# Patient Record
Sex: MALE | Race: WHITE | Employment: STUDENT | URBAN - METROPOLITAN AREA
[De-identification: names, ages, dates, MRNs, and addresses within clinical notes are randomized per-mention and may not be internally consistent; named-entity substitution may affect disease eponyms.]

---

## 2017-02-08 ENCOUNTER — OFFICE VISIT (OUTPATIENT)
Dept: FAMILY MEDICINE | Facility: CLINIC | Age: 22
End: 2017-02-08

## 2017-02-08 VITALS
SYSTOLIC BLOOD PRESSURE: 140 MMHG | HEIGHT: 75 IN | BODY MASS INDEX: 21.01 KG/M2 | WEIGHT: 169 LBS | HEART RATE: 93 BPM | DIASTOLIC BLOOD PRESSURE: 73 MMHG

## 2017-02-08 DIAGNOSIS — J01.10 ACUTE NON-RECURRENT FRONTAL SINUSITIS: Primary | ICD-10-CM

## 2017-02-08 RX ORDER — AZITHROMYCIN 250 MG/1
TABLET, FILM COATED ORAL
Qty: 6 TABLET | Refills: 1 | Status: SHIPPED | OUTPATIENT
Start: 2017-02-08 | End: 2018-12-09

## 2017-02-08 NOTE — Clinical Note
Date:February 9, 2017      Patient was self referred, no letter generated. Do not send.        [facility] Health Information

## 2017-02-08 NOTE — PROGRESS NOTES
HPI:  Jackson R Wellenstein is a 21 year old male with nasal congestion, sinus pain, cough    PMH:  No past medical history on file.    Active problem list:  There is no problem list on file for this patient.      FH:  No family history on file.    SH:  Social History     Social History     Marital Status: Single     Spouse Name: N/A     Number of Children: N/A     Years of Education: N/A     Occupational History     Not on file.     Social History Main Topics     Smoking status: Never Smoker      Smokeless tobacco: Never Used     Alcohol Use: No     Drug Use: No     Sexual Activity:     Partners: Female     Birth Control/ Protection: Condom     Other Topics Concern     Not on file     Social History Narrative       MEDS:  See EMR, reviewed  ALL:  See EMR, reviewed    REVIEW OF SYSTEMS:  CONSTITUTIONAL:NEGATIVE for fever, chills, change in weight  INTEGUMENTARY/SKIN: NEGATIVE for worrisome rashes, moles or lesions  EYES: NEGATIVE for vision changes or irritation  ENT/MOUTH: NEGATIVE for ear, mouth and throat problems  RESP:NEGATIVE for significant cough or SOB  BREAST: NEGATIVE for masses, tenderness or discharge  CV: NEGATIVE for chest pain, palpitations or peripheral edema  GI: NEGATIVE for nausea, abdominal pain, heartburn, or change in bowel habits  :NEGATIVE for frequency, dysuria, or hematuria  :NEGATIVE for frequency, dysuria, or hematuria  NEURO: NEGATIVE for weakness, dizziness or paresthesias  ENDOCRINE: NEGATIVE for temperature intolerance, skin/hair changes  HEME/ALLERGY/IMMUNE: NEGATIVE for bleeding problems  PSYCHIATRIC: NEGATIVE for changes in mood or affect          SUBJECTIVE:  This 21-year-old male had onset about a week and half ago of sore throat.  The sore throat resolved but it spread now to a persistent cough, nasal stuffiness, sinus congestion and a sense of pain about his frontal sinuses.  He feels achy and uncomfortable.  He did not get a flu shot this year.        OBJECTIVE:   Temperature is 99.4.  His nasopharynx is stuffy and he has a recurrent productive cough.  He says he has been noting bloody discharge with blowing his nose.  He has discomfort over the frontal sinuses, but there is no nuchal rigidity.  His neck is supple.  His oropharynx is not reddened and not swollen.  There is no posterior or anterior cervical adenopathy that is tender.  His thyroid is nontender and nonenlarged.  Chest is clear to auscultation in all fields.  Peripheral pulses are strong and symmetrical.      ASSESSMENT:  Sinusitis and respiratory infection.      PLAN:  Zithromax 250 mg 2 p.o. day 1 and 1 p.o. day 2-5.  Afrin nasal spray x3 days.

## 2017-02-08 NOTE — Clinical Note
2/8/2017      RE: Jackson R Wellenstein  [address] [address]       HPI:  Jackson R Wellenstein is a 21 year old male with nasal congestion, sinus pain, cough    PMH:  No past medical history on file.    Active problem list:  There is no problem list on file for this patient.      FH:  No family history on file.    SH:  Social History     Social History     Marital Status: Single     Spouse Name: N/A     Number of Children: N/A     Years of Education: N/A     Occupational History     Not on file.     Social History Main Topics     Smoking status: Never Smoker      Smokeless tobacco: Never Used     Alcohol Use: No     Drug Use: No     Sexual Activity:     Partners: Female     Birth Control/ Protection: Condom     Other Topics Concern     Not on file     Social History Narrative       MEDS:  See EMR, reviewed  ALL:  See EMR, reviewed    REVIEW OF SYSTEMS:  CONSTITUTIONAL:NEGATIVE for fever, chills, change in weight  INTEGUMENTARY/SKIN: NEGATIVE for worrisome rashes, moles or lesions  EYES: NEGATIVE for vision changes or irritation  ENT/MOUTH: NEGATIVE for ear, mouth and throat problems  RESP:NEGATIVE for significant cough or SOB  BREAST: NEGATIVE for masses, tenderness or discharge  CV: NEGATIVE for chest pain, palpitations or peripheral edema  GI: NEGATIVE for nausea, abdominal pain, heartburn, or change in bowel habits  :NEGATIVE for frequency, dysuria, or hematuria  :NEGATIVE for frequency, dysuria, or hematuria  NEURO: NEGATIVE for weakness, dizziness or paresthesias  ENDOCRINE: NEGATIVE for temperature intolerance, skin/hair changes  HEME/ALLERGY/IMMUNE: NEGATIVE for bleeding problems  PSYCHIATRIC: NEGATIVE for changes in mood or affect          SUBJECTIVE:  This 21-year-old male had onset about a week and half ago of sore throat.  The sore throat resolved but it spread now to a persistent cough, nasal stuffiness, sinus congestion and a sense of pain about his frontal sinuses.  He feels achy  and uncomfortable.  He did not get a flu shot this year.        OBJECTIVE:  Temperature is 99.4.  His nasopharynx is stuffy and he has a recurrent productive cough.  He says he has been noting bloody discharge with blowing his nose.  He has discomfort over the frontal sinuses, but there is no nuchal rigidity.  His neck is supple.  His oropharynx is not reddened and not swollen.  There is no posterior or anterior cervical adenopathy that is tender.  His thyroid is nontender and nonenlarged.  Chest is clear to auscultation in all fields.  Peripheral pulses are strong and symmetrical.      ASSESSMENT:  Sinusitis and respiratory infection.      PLAN:  Zithromax 250 mg 2 p.o. day 1 and 1 p.o. day 2-5.  Afrin nasal spray x3 days.                           Ammon Buchanan MD

## 2017-06-06 ENCOUNTER — OFFICE VISIT (OUTPATIENT)
Dept: FAMILY MEDICINE | Facility: CLINIC | Age: 22
End: 2017-06-06

## 2017-06-06 VITALS
BODY MASS INDEX: 21.14 KG/M2 | WEIGHT: 170 LBS | HEART RATE: 48 BPM | DIASTOLIC BLOOD PRESSURE: 51 MMHG | SYSTOLIC BLOOD PRESSURE: 120 MMHG | HEIGHT: 75 IN

## 2017-06-06 DIAGNOSIS — S76.112A QUADRICEPS MUSCLE RUPTURE, LEFT, INITIAL ENCOUNTER: Primary | ICD-10-CM

## 2017-06-06 NOTE — MR AVS SNAPSHOT
"              After Visit Summary   6/6/2017    Jackson R Wellenstein    MRN: [id number]           Patient Information     Date Of Birth          1995        Visit Information        Provider Department      6/6/2017 11:15 AM Brittni Jha MD [facility]        Today's Diagnoses     Quadriceps muscle rupture, left, initial encounter    -  1       Follow-ups after your visit        Who to contact     Please call your clinic at [phone] to:    Ask questions about your health    Make or cancel appointments    Discuss your medicines    Learn about your test results    Speak to your doctor   If you have compliments or concerns about an experience at your clinic, or if you wish to file a complaint, please contact [facility] Patient Relations at [phone] or email us at [email or web address]         Additional Information About Your Visit        MyChart Information     [employer] gives you secure access to your electronic health record. If you see a primary care provider, you can also send messages to your care team and make appointments. If you have questions, please call your primary care clinic.  If you do not have a primary care provider, please call [phone] and they will assist you.      [employer] is an electronic gateway that provides easy, online access to your medical records. With [employer], you can request a clinic appointment, read your test results, renew a prescription or communicate with your care team.     To access your existing account, please contact your [facility] or call [phone] for assistance.        Care EveryWhere ID     This is your Care EveryWhere ID. This could be used by other organizations to access your [place] medical records  [id number]        Your Vitals Were     Pulse Height BMI (Body Mass Index)             48 1.905 m (6' 3\") 21.25 kg/m2          Blood Pressure from " Last 3 Encounters:   06/06/17 120/51   02/08/17 140/73   05/24/16 120/71    Weight from Last 3 Encounters:   06/06/17 77.1 kg (170 lb)   02/08/17 76.7 kg (169 lb)   05/24/16 77.1 kg (170 lb) (72 %)*     * Growth percentiles are based on [facility] 2-20 Years data.               Primary Care Provider    None Specified       No primary provider on file.        Thank you!     Thank you for choosing [facility]  for your care. Our goal is always to provide you with excellent care. Hearing back from our patients is one way we can continue to improve our services. Please take a few minutes to complete the written survey that you may receive in the mail after your visit with us. Thank you!             Your Updated Medication List - Protect others around you: Learn how to safely use, store and throw away your medicines at www.disposemymeds.org.          This list is accurate as of: 6/6/17 11:59 PM.  Always use your most recent med list.                   Brand Name Dispense Instructions for use    azithromycin 250 MG tablet    ZITHROMAX Z-VERONICA    6 tablet    Two pill orally day one, one pill orally days 2-5

## 2017-06-06 NOTE — LETTER
"  6/6/2017      RE: Jackson R Wellenstein  [address] [address]       HPI      Cc: L quad strain / mass  - pulled both quads this semester  - runs Track and Field  - R one started in the fall  - L one start over spring break  - has kept running and racing  - has noted a bump in the L quad  - R quad is 100% better  - L quad is somewhat inflexible, about 90% strong as well  - has been working to rehab the quad, but feels he has stopped improving      Medical History     Past Surgical History:   Procedure Laterality Date     TOE SURGERY Right summer 2013    OCD removal       No past medical history on file.    No Known Allergies    Social History     Social History     Marital status: Single     Spouse name: N/A     Number of children: N/A     Years of education: N/A     Occupational History     Not on file.     Social History Main Topics     Smoking status: Never Smoker     Smokeless tobacco: Never Used     Alcohol use No     Drug use: No     Sexual activity: Not Currently     Partners: Female     Birth control/ protection: Condom     Other Topics Concern     Not on file     Social History Narrative       No family history on file.    Current Outpatient Prescriptions   Medication     azithromycin (ZITHROMAX Z-VERONICA) 250 MG tablet     No current facility-administered medications for this visit.            Objective Findings     Vitals:    06/06/17 1112   BP: 120/51   Pulse: (!) 48   Weight: 170 lb (77.1 kg)   Height: 6' 3\" (1.905 m)         Physical Exam:  Gen: A&O in NAD  Psych: normal affect    MSK: L LE - mild deformity of the proximal quadriceps noted with activation of the muscle group.  The area where this is noted is mildly TTP, no hardening is noted.  FROM of the hip and knee pain free.  4/5 strength with straight leg raise on the L, otherwise 5/5 throughout LE b/l.        Assessment / Plan     1) L Quad Rupture  - bedside US today with evidence for likely proximal partial L quadriceps rupture with " little calcification at present  - giving symptoms ongoing x2+ months and plateau'd strength in rehab, check MRI to better characterize the lesion  - check XR as well  - cont rehab and focus on eccentrics for quads  - fu in 1 week for re-eval, could consider PRP depending on imaging results    Varun Aguilar ATC, was present for the entire encounter    patient seen and case dw Dr. Yudelka Rahman D.O.  Sports Medicine Fellow      Attending Note:   I have personally examined this patient and have reviewed the clinical presentation and progress note with the fellow. I agree with the treatment plan as outlined. The plan was formulated with the fellow on the day of the patient's visit. I have reviewed all imaging with the fellow and agree with the findings in the documentation.     Brittni Jha MD, CAQ, CCD  [facility]  Sports Medicine and Bone Health    Brittni Jha MD

## 2017-06-06 NOTE — LETTER
Date:June 13, 2017      Patient was self referred, no letter generated. Do not send.        [facility] Health Information

## 2017-06-06 NOTE — PROGRESS NOTES
Attending Note:   I have personally examined this patient and have reviewed the clinical presentation and progress note with the fellow. I agree with the treatment plan as outlined. The plan was formulated with the fellow on the day of the patient's visit. I have reviewed all imaging with the fellow and agree with the findings in the documentation.     Brittni Jha MD, CAQ, CCD  [facility]  Sports Medicine and Bone Health

## 2017-06-06 NOTE — PROGRESS NOTES
"HPI      Cc: L quad strain / mass  - pulled both quads this semester  - runs Track and Field  - R one started in the fall  - L one start over spring break  - has kept running and racing  - has noted a bump in the L quad  - R quad is 100% better  - L quad is somewhat inflexible, about 90% strong as well  - has been working to rehab the quad, but feels he has stopped improving      Medical History     Past Surgical History:   Procedure Laterality Date     TOE SURGERY Right summer 2013    OCD removal       No past medical history on file.    No Known Allergies    Social History     Social History     Marital status: Single     Spouse name: N/A     Number of children: N/A     Years of education: N/A     Occupational History     Not on file.     Social History Main Topics     Smoking status: Never Smoker     Smokeless tobacco: Never Used     Alcohol use No     Drug use: No     Sexual activity: Not Currently     Partners: Female     Birth control/ protection: Condom     Other Topics Concern     Not on file     Social History Narrative       No family history on file.    Current Outpatient Prescriptions   Medication     azithromycin (ZITHROMAX Z-VERONICA) 250 MG tablet     No current facility-administered medications for this visit.            Objective Findings     Vitals:    06/06/17 1112   BP: 120/51   Pulse: (!) 48   Weight: 170 lb (77.1 kg)   Height: 6' 3\" (1.905 m)         Physical Exam:  Gen: A&O in NAD  Psych: normal affect    MSK: L LE - mild deformity of the proximal quadriceps noted with activation of the muscle group.  The area where this is noted is mildly TTP, no hardening is noted.  FROM of the hip and knee pain free.  4/5 strength with straight leg raise on the L, otherwise 5/5 throughout LE b/l.        Assessment / Plan     1) L Quad Rupture  - bedside US today with evidence for likely proximal partial L quadriceps rupture with little calcification at present  - giving symptoms ongoing x2+ months and plateau'd " strength in rehab, check MRI to better characterize the lesion  - check XR as well  - cont rehab and focus on eccentrics for quads  - fu in 1 week for re-eval, could consider PRP depending on imaging results    Varun Aguilar ATC, was present for the entire encounter    patient seen and case dw Dr. Yudelka Rahman D.O.  Sports Medicine Fellow

## 2017-06-15 ENCOUNTER — OFFICE VISIT (OUTPATIENT)
Dept: ORTHOPEDICS | Facility: CLINIC | Age: 22
End: 2017-06-15

## 2017-06-15 VITALS
DIASTOLIC BLOOD PRESSURE: 51 MMHG | HEIGHT: 75 IN | SYSTOLIC BLOOD PRESSURE: 120 MMHG | BODY MASS INDEX: 21.14 KG/M2 | WEIGHT: 170 LBS

## 2017-06-15 DIAGNOSIS — S76.912D: Primary | ICD-10-CM

## 2017-06-15 RX ORDER — HYDROCODONE BITARTRATE AND ACETAMINOPHEN 5; 325 MG/1; MG/1
1-2 TABLET ORAL EVERY 4 HOURS PRN
Qty: 20 TABLET | Refills: 0 | Status: SHIPPED | OUTPATIENT
Start: 2017-06-15 | End: 2018-12-09

## 2017-06-15 NOTE — LETTER
"  6/15/2017      RE: Jackson R Wellenstein  [address] [address]       Attending Note:   I have personally examined this patient and have reviewed the clinical presentation and progress note with the fellow. I agree with the treatment plan as outlined. The plan was formulated with the fellow on the day of the patient's visit. I have reviewed all imaging with the fellow and agree with the findings in the documentation. I have assisted with and supervised the entire blood draw, PRP preparation and injection via MSK US guided performed by Dr. Rahman.     Brittni Jha MD, CAQ, CCD  [facility]  Sports Medicine and Bone Health    HPI      cc: PRP injection  - Shreyas presents today for PRP injection of rectus femoris tear      Medical History     Past Surgical History:   Procedure Laterality Date     TOE SURGERY Right summer 2013    OCD removal       No past medical history on file.    No Known Allergies    Social History     Social History     Marital status: Single     Spouse name: N/A     Number of children: N/A     Years of education: N/A     Occupational History     Not on file.     Social History Main Topics     Smoking status: Never Smoker     Smokeless tobacco: Never Used     Alcohol use No     Drug use: No     Sexual activity: Not Currently     Partners: Female     Birth control/ protection: Condom     Other Topics Concern     Not on file     Social History Narrative       No family history on file.    Current Outpatient Prescriptions   Medication     HYDROcodone-acetaminophen (NORCO) 5-325 MG per tablet     azithromycin (ZITHROMAX Z-VERONICA) 250 MG tablet     No current facility-administered medications for this visit.            Objective Findings     Vitals:    06/15/17 1458   BP: 120/51   Weight: 170 lb (77.1 kg)   Height: 6' 3\" (1.905 m)         Physical Exam:  Gen: A&O in NAD  CV: regular rate, extremities well perfused x4  Pulm: regular rate, without respiratory " distress  Derm: no rashes or lesions  Psych: normal affect    MSK: L Thigh - notable defect / bulge in anterior, proximal left thigh    Procedure Note  Procedure/Location: L Rectus Femoris PRP injection with ultrasound guidance  Approach: Anterior   Prior to procedure, risks and benefits explained to patient and written consent obtained.  Injectate: 6.5mL PRP, 15mL Lidocaine 1% w/o epinephrine using a 22g needle  Prior to injection, skin site was sterilized.  Local anesthetic was injected and well tolerated.  Injection was then administered in sterile fashion without complication under ultrasound guidance.  Bandage was placed over site.  Post procedure instructions discussed with patient.  Images of the procedure stored on the ultrasound unit used to perform the procedure.        Assessment / Plan     1) L Rectus Femoris Tear  - USG PRP injection successfully performed as above  - discussed to lightly limit activity for next few days and no NSAIDs  - short script of vicodin given for pain control  - f/u PRN    patient seen and case dw Dr. Yudelka Rahman, D.O.  Sports Medicine Fellow      Oleg Rahman MD

## 2017-06-15 NOTE — LETTER
Date:June 20, 2017      Patient was self referred, no letter generated. Do not send.        [facility] Health Information

## 2017-06-15 NOTE — MR AVS SNAPSHOT
"              After Visit Summary   6/15/2017    Jackson R Wellenstein    MRN: [id number]           Patient Information     Date Of Birth          1995        Visit Information        Provider Department      6/15/2017 3:00 PM Oleg Rahman MD [facility]        Today's Diagnoses     Rupture of rectus femoris tendon, left, subsequent encounter    -  1       Follow-ups after your visit        Who to contact     Please call your clinic at [phone] to:    Ask questions about your health    Make or cancel appointments    Discuss your medicines    Learn about your test results    Speak to your doctor   If you have compliments or concerns about an experience at your clinic, or if you wish to file a complaint, please contact [facility] Patient Relations at [phone] or email us at [email or web address]         Additional Information About Your Visit        MyChart Information     [employer] gives you secure access to your electronic health record. If you see a primary care provider, you can also send messages to your care team and make appointments. If you have questions, please call your primary care clinic.  If you do not have a primary care provider, please call [phone] and they will assist you.      [employer] is an electronic gateway that provides easy, online access to your medical records. With [employer], you can request a clinic appointment, read your test results, renew a prescription or communicate with your care team.     To access your existing account, please contact your [facility] or call [phone] for assistance.        Care EveryWhere ID     This is your Care EveryWhere ID. This could be used by other organizations to access your [facility] medical records  [id number]        Your Vitals Were     Height BMI (Body Mass Index)                6' 3\" (1.905 m) 21.25 kg/m2           Blood Pressure from Last 3 " Encounters:   06/15/17 120/51   06/06/17 120/51   02/08/17 140/73    Weight from Last 3 Encounters:   06/15/17 170 lb (77.1 kg)   06/06/17 170 lb (77.1 kg)   02/08/17 169 lb (76.7 kg)              Today, you had the following     No orders found for display         Today's Medication Changes          These changes are accurate as of: 6/15/17 11:59 PM.  If you have any questions, ask your nurse or doctor.               Start taking these medicines.        Dose/Directions    HYDROcodone-acetaminophen 5-325 MG per tablet   Commonly known as:  NORCO   Used for:  Rupture of rectus femoris tendon, left, subsequent encounter   Started by:  Oleg Rahman MD        Dose:  1-2 tablet   Take 1-2 tablets by mouth every 4 hours as needed for moderate to severe pain   Quantity:  20 tablet   Refills:  0            Where to get your medicines      Some of these will need a paper prescription and others can be bought over the counter.  Ask your nurse if you have questions.     Bring a paper prescription for each of these medications     HYDROcodone-acetaminophen 5-325 MG per tablet                Primary Care Provider    None Specified       No primary provider on file.        Thank you!     Thank you for choosing [facility]  for your care. Our goal is always to provide you with excellent care. Hearing back from our patients is one way we can continue to improve our services. Please take a few minutes to complete the written survey that you may receive in the mail after your visit with us. Thank you!             Your Updated Medication List - Protect others around you: Learn how to safely use, store and throw away your medicines at www.disposemymeds.org.          This list is accurate as of: 6/15/17 11:59 PM.  Always use your most recent med list.                   Brand Name Dispense Instructions for use    azithromycin 250 MG tablet    ZITHROMAX Z-VERONICA    6 tablet    Two pill orally day one, one pill orally days  2-5       HYDROcodone-acetaminophen 5-325 MG per tablet    NORCO    20 tablet    Take 1-2 tablets by mouth every 4 hours as needed for moderate to severe pain

## 2017-06-16 NOTE — PROGRESS NOTES
"HPI      cc: PRP injection  - Shreyas presents today for PRP injection of rectus femoris tear      Medical History     Past Surgical History:   Procedure Laterality Date     TOE SURGERY Right summer 2013    OCD removal       No past medical history on file.    No Known Allergies    Social History     Social History     Marital status: Single     Spouse name: N/A     Number of children: N/A     Years of education: N/A     Occupational History     Not on file.     Social History Main Topics     Smoking status: Never Smoker     Smokeless tobacco: Never Used     Alcohol use No     Drug use: No     Sexual activity: Not Currently     Partners: Female     Birth control/ protection: Condom     Other Topics Concern     Not on file     Social History Narrative       No family history on file.    Current Outpatient Prescriptions   Medication     HYDROcodone-acetaminophen (NORCO) 5-325 MG per tablet     azithromycin (ZITHROMAX Z-VERONICA) 250 MG tablet     No current facility-administered medications for this visit.            Objective Findings     Vitals:    06/15/17 1458   BP: 120/51   Weight: 170 lb (77.1 kg)   Height: 6' 3\" (1.905 m)         Physical Exam:  Gen: A&O in NAD  CV: regular rate, extremities well perfused x4  Pulm: regular rate, without respiratory distress  Derm: no rashes or lesions  Psych: normal affect    MSK: L Thigh - notable defect / bulge in anterior, proximal left thigh    Procedure Note  Procedure/Location: L Rectus Femoris PRP injection with ultrasound guidance  Approach: Anterior   Prior to procedure, risks and benefits explained to patient and written consent obtained.  Injectate: 6.5mL PRP, 15mL Lidocaine 1% w/o epinephrine using a 22g needle  Prior to injection, skin site was sterilized.  Local anesthetic was injected and well tolerated.  Injection was then administered in sterile fashion without complication under ultrasound guidance.  Bandage was placed over site.  Post procedure instructions " discussed with patient.  Images of the procedure stored on the ultrasound unit used to perform the procedure.        Assessment / Plan     1) L Rectus Femoris Tear  - USG PRP injection successfully performed as above  - discussed to lightly limit activity for next few days and no NSAIDs  - short script of vicodin given for pain control  - f/u PRN    patient seen and case dw Dr. Yudelka Rahman, STEPHEN.O.  Sports Medicine Fellow

## 2017-08-22 ENCOUNTER — OFFICE VISIT (OUTPATIENT)
Dept: FAMILY MEDICINE | Facility: CLINIC | Age: 22
End: 2017-08-22

## 2017-08-22 VITALS
SYSTOLIC BLOOD PRESSURE: 123 MMHG | HEIGHT: 75 IN | DIASTOLIC BLOOD PRESSURE: 76 MMHG | BODY MASS INDEX: 22.26 KG/M2 | HEART RATE: 59 BPM | WEIGHT: 179 LBS

## 2017-08-22 DIAGNOSIS — S76.112S QUADRICEPS MUSCLE RUPTURE, LEFT, SEQUELA: Primary | ICD-10-CM

## 2017-08-22 NOTE — MR AVS SNAPSHOT
"              After Visit Summary   8/22/2017    Jackson R Wellenstein    MRN: [id number]           Patient Information     Date Of Birth          1995        Visit Information        Provider Department      8/22/2017 3:15 PM Brittni Jha MD [facility]        Today's Diagnoses     Quadriceps muscle rupture, left, sequela    -  1       Follow-ups after your visit        Who to contact     Please call your clinic at [phone] to:    Ask questions about your health    Make or cancel appointments    Discuss your medicines    Learn about your test results    Speak to your doctor   If you have compliments or concerns about an experience at your clinic, or if you wish to file a complaint, please contact [facility] Patient Relations at [phone] or email us at [email or web address]         Additional Information About Your Visit        MyChart Information     [employer] gives you secure access to your electronic health record. If you see a primary care provider, you can also send messages to your care team and make appointments. If you have questions, please call your primary care clinic.  If you do not have a primary care provider, please call [phone] and they will assist you.      [employer] is an electronic gateway that provides easy, online access to your medical records. With [employer], you can request a clinic appointment, read your test results, renew a prescription or communicate with your care team.     To access your existing account, please contact your [facility] or call [phone] for assistance.        Care EveryWhere ID     This is your Care EveryWhere ID. This could be used by other organizations to access your [place] medical records  [id number]        Your Vitals Were     Pulse Height BMI (Body Mass Index)             59 6' 3\" (1.905 m) 22.37 kg/m2          Blood Pressure from Last 3 " Encounters:   08/22/17 123/76   06/15/17 120/51   06/06/17 120/51    Weight from Last 3 Encounters:   08/22/17 179 lb (81.2 kg)   06/15/17 170 lb (77.1 kg)   06/06/17 170 lb (77.1 kg)              Today, you had the following     No orders found for display       Primary Care Provider    None Specified       No primary provider on file.        Equal Access to Services     [facility] : Hadii neela Ram, walewda ashwini, qatyrellta kaalmada pita, ermelinda dunlapmirapattie de león . So [facility] [phone].    ATENCIÓN: Si kota joshi, tiene a barrientos disposición servicios gratuitos de asistencia lingüística. Llame al [phone].    We comply with applicable federal civil rights laws and [place] laws. We do not discriminate on the basis of race, color, national origin, age, disability sex, sexual orientation or gender identity.            Thank you!     Thank you for choosing [facility]  for your care. Our goal is always to provide you with excellent care. Hearing back from our patients is one way we can continue to improve our services. Please take a few minutes to complete the written survey that you may receive in the mail after your visit with us. Thank you!             Your Updated Medication List - Protect others around you: Learn how to safely use, store and throw away your medicines at www.disposemymeds.org.          This list is accurate as of: 8/22/17 11:59 PM.  Always use your most recent med list.                   Brand Name Dispense Instructions for use Diagnosis    azithromycin 250 MG tablet    ZITHROMAX Z-VERONICA    6 tablet    Two pill orally day one, one pill orally days 2-5    Acute non-recurrent frontal sinusitis       HYDROcodone-acetaminophen 5-325 MG per tablet    NORCO    20 tablet    Take 1-2 tablets by mouth every 4 hours as needed for moderate to severe pain    Rupture of rectus femoris tendon, left, subsequent encounter

## 2017-08-22 NOTE — LETTER
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  He is here today to follow up on his PRP to the left rectus femoris muscle done 06/2017.   The patient states that it took about 5 weeks to really notice a difference but seems to have really helped.  He is back to running fully with no pain.  He has done his rehab.  His strength is good.  He feels that it is even on both sides.  He is not having any pain with lifting.  His inflexibility has improved as well.  He is very happy he did the PRP.  He has a mass that he notes which he had prior, although it does not seem to bother him any longer.      OBJECTIVE:  Left quad, he does have a mass from the tear of the rectus femoris that is in the mid proximal thigh anteriorly.  It is really nontender to palpation.  He has good strength, good flexibility.  We looked at it with the musculoskeletal ultrasound.  He has definitely shown some improvement in the defects that were previously seen on ultrasound.      ASSESSMENT:  Left rectus femoris tear, status post PRP doing well.      PLAN:  At this time Shreyas is very functional with no pain and we feel that he can be released to all activities as tolerated.  We are happy to see him back at any time if he is having difficulties or problems.        Bandar Avelar MD, Sports Medicine Fellow, and Valdo Serrano ATC, were present for the entire appointment.     Brittni Jha MD, CAQ, FACSM, CCD  [facility]  Sports Medicine and Bone Health  Team Physician;  Athletics      Brittni Jha MD

## 2017-08-22 NOTE — PROGRESS NOTES
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  He is here today to follow up on his PRP to the left rectus femoris muscle done 06/2017.   The patient states that it took about 5 weeks to really notice a difference but seems to have really helped.  He is back to running fully with no pain.  He has done his rehab.  His strength is good.  He feels that it is even on both sides.  He is not having any pain with lifting.  His inflexibility has improved as well.  He is very happy he did the PRP.  He has a mass that he notes which he had prior, although it does not seem to bother him any longer.      OBJECTIVE:  Left quad, he does have a mass from the tear of the rectus femoris that is in the mid proximal thigh anteriorly.  It is really nontender to palpation.  He has good strength, good flexibility.  We looked at it with the musculoskeletal ultrasound.  He has definitely shown some improvement in the defects that were previously seen on ultrasound.      ASSESSMENT:  Left rectus femoris tear, status post PRP doing well.      PLAN:  At this time Shreyas is very functional with no pain and we feel that he can be released to all activities as tolerated.  We are happy to see him back at any time if he is having difficulties or problems.        Bandar Avelar MD, Sports Medicine Fellow, and Valdo Serrano ATC, were present for the entire appointment.     Brittni Jha MD, CAQ, FACSM, CCD  [facility]  Sports Medicine and Bone Health  Team Physician;  Athletics

## 2017-08-22 NOTE — LETTER
Date:August 24, 2017      Patient was self referred, no letter generated. Do not send.        [facility] Health Information

## 2017-12-31 ENCOUNTER — HEALTH MAINTENANCE LETTER (OUTPATIENT)
Age: 22
End: 2017-12-31

## 2018-01-26 DIAGNOSIS — M79.671 RIGHT FOOT PAIN: Primary | ICD-10-CM

## 2018-01-27 ENCOUNTER — RADIANT APPOINTMENT (OUTPATIENT)
Dept: MRI IMAGING | Facility: CLINIC | Age: 23
End: 2018-01-27
Attending: FAMILY MEDICINE
Payer: COMMERCIAL

## 2018-01-27 DIAGNOSIS — M79.671 RIGHT FOOT PAIN: ICD-10-CM

## 2018-01-31 ENCOUNTER — OFFICE VISIT (OUTPATIENT)
Dept: ORTHOPEDICS | Facility: CLINIC | Age: 23
End: 2018-01-31
Payer: COMMERCIAL

## 2018-01-31 VITALS — SYSTOLIC BLOOD PRESSURE: 126 MMHG | HEART RATE: 51 BPM | DIASTOLIC BLOOD PRESSURE: 65 MMHG | RESPIRATION RATE: 16 BRPM

## 2018-01-31 DIAGNOSIS — M84.376A STRESS FRACTURE OF METATARSAL BONE, UNSPECIFIED LATERALITY, INITIAL ENCOUNTER: Primary | ICD-10-CM

## 2018-01-31 NOTE — LETTER
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 He was seen prior by this writer at a track event last Fri. He had moderate foot pain he noted while pushing off for sprints. Otherwise has no foot pain.     Background:   Date of injury: NA   Duration of symptoms: 2 weeks  Mechanism of Injury: Insidious Onset; Sports Related Track  Aggravating factors: Running, TTP  Relieving Factors: rest, ice and splinting  Prior Evaluation: MRI: 1/27/18    PAST MEDICAL, SOCIAL, SURGICAL AND FAMILY HISTORY: He  has no past medical history of Arthritis; Bleeding disorder (H); Cancer (H); Chronic kidney disease; CVA (cerebral infarction); Degenerative joint disease; Diabetes (H); Heart disease; Hepatitis; Hypertension; Surgical complication; or Uncomplicated asthma.  He  has a past surgical history that includes Toe Surgery (Right, summer 2013).  His family history is not on file.  He reports that he has never smoked. He has never used smokeless tobacco. He reports that he does not drink alcohol or use illicit drugs.    ALLERGIES: He has No Known Allergies.    CURRENT MEDICATIONS: He has a current medication list which includes the following prescription(s): hydrocodone-acetaminophen and azithromycin.     REVIEW OF SYSTEMS: 3 point review of systems is negative except as noted above.     Exam:   /65  Pulse 51  Resp 16           CONSTITUTIONAL: healthy, alert and no distress  HEAD: Normocephalic. No masses, lesions, tenderness or abnormalities  SKIN: no suspicious lesions or rashes  GAIT: normal  NEUROLOGIC: Non-focal  PSYCHIATRIC: affect normal/bright and mentation appears normal.    MUSCULOSKELETAL: TTP along lateral aspect of first MT, approximately 1/3 from distal articulation. No pain with resisted great toe extension/flexion. No pain with foot dorsi/plantar  flexion. No pain with inversion/eversion. No laxity.        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 He clinically is functional and is able to go about his ADLs without difficulty, but has evidence on exam and MRI of BSI.     Discussed that he should remain in boot while doing ADLs to provide maximum rest. Also advised that he could continue to run this weekend with the knowledge he may injure the area further. This is unlikely but still possible and he reported he understood this risk. Given his level of function and pain he likely can compete, but we advised he limit the amount of training and impact activities this week.     Will continue to follow and ask he RTC next Mon or Tues to evaluate how this weekend's events went in [place].     Ken Avelar MD  Primary Care Sports Medicine Fellow  February 3, 2018      Attending Note:   I have personally examined this patient and have reviewed the clinical presentation and progress note with the fellow. I agree with the treatment plan as outlined. The plan was formulated with the fellow on the day of the patient's visit. I have reviewed all imaging with the fellow and agree with the findings in the documentation.     Brittni Jha MD, CAQ, CCD  [facility]  Sports Medicine and Bone Health    Brittni Jha MD

## 2018-01-31 NOTE — LETTER
Date:February 9, 2018      Patient was self referred, no letter generated. Do not send.        [facility] Health Information

## 2018-01-31 NOTE — MR AVS SNAPSHOT
After Visit Summary   1/31/2018    Jackson R Wellenstein    MRN: [id number]           Patient Information     Date Of Birth          1995        Visit Information        Provider Department      1/31/2018 3:00 PM Brittni Jha MD [facility] Sports Medicine        Today's Diagnoses     Stress fracture of metatarsal bone, unspecified laterality, initial encounter    -  1       Follow-ups after your visit        Who to contact     Please call your clinic at [phone] to:    Ask questions about your health    Make or cancel appointments    Discuss your medicines    Learn about your test results    Speak to your doctor            Additional Information About Your Visit        MyChart Information     [employer] gives you secure access to your electronic health record. If you see a primary care provider, you can also send messages to your care team and make appointments. If you have questions, please call your primary care clinic.  If you do not have a primary care provider, please call [phone] and they will assist you.      [employer] is an electronic gateway that provides easy, online access to your medical records. With [employer], you can request a clinic appointment, read your test results, renew a prescription or communicate with your care team.     To access your existing account, please contact your [facility] or call [phone] for assistance.        Care EveryWhere ID     This is your Care EveryWhere ID. This could be used by other organizations to access your [place] medical records  [id number]        Your Vitals Were     Pulse Respirations                51 16           Blood Pressure from Last 3 Encounters:   02/06/18 142/74   01/31/18 126/65   08/22/17 123/76    Weight from Last 3 Encounters:   02/06/18 179 lb (81.2 kg)   08/22/17 179 lb (81.2 kg)   06/15/17 170 lb (77.1 kg)              Today, you had the following     No orders found for  display       Primary Care Provider    None Specified       No primary provider on file.        Equal Access to Services     BELEN INMAN : Hadii aad ku hadnuryslaury Ram, walewcarmita maradiaga, eliceoketan jeffersonbrittanyermelinda willismirapattie hammer. So [facility] [phone].    ATENCIÓN: Si habla español, tiene a barrientos disposición servicios gratuitos de asistencia lingüística. Llame al [phone].    We comply with applicable federal civil rights laws and [place] laws. We do not discriminate on the basis of race, color, national origin, age, disability, sex, sexual orientation, or gender identity.            Thank you!     Thank you for choosing [facility]  for your care. Our goal is always to provide you with excellent care. Hearing back from our patients is one way we can continue to improve our services. Please take a few minutes to complete the written survey that you may receive in the mail after your visit with us. Thank you!             Your Updated Medication List - Protect others around you: Learn how to safely use, store and throw away your medicines at www.disposemymeds.org.          This list is accurate as of 1/31/18 11:59 PM.  Always use your most recent med list.                   Brand Name Dispense Instructions for use Diagnosis    azithromycin 250 MG tablet    ZITHROMAX Z-VERONICA    6 tablet    Two pill orally day one, one pill orally days 2-5    Acute non-recurrent frontal sinusitis       HYDROcodone-acetaminophen 5-325 MG per tablet    NORCO    20 tablet    Take 1-2 tablets by mouth every 4 hours as needed for moderate to severe pain    Rupture of rectus femoris tendon, left, subsequent encounter

## 2018-01-31 NOTE — PROGRESS NOTES
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 He was seen prior by this writer at a track event last Fri. He had moderate foot pain he noted while pushing off for sprints. Otherwise has no foot pain.     Background:   Date of injury: NA   Duration of symptoms: 2 weeks  Mechanism of Injury: Insidious Onset; Sports Related Track  Aggravating factors: Running, TTP  Relieving Factors: rest, ice and splinting  Prior Evaluation: MRI: 1/27/18    PAST MEDICAL, SOCIAL, SURGICAL AND FAMILY HISTORY: He  has no past medical history of Arthritis; Bleeding disorder (H); Cancer (H); Chronic kidney disease; CVA (cerebral infarction); Degenerative joint disease; Diabetes (H); Heart disease; Hepatitis; Hypertension; Surgical complication; or Uncomplicated asthma.  He  has a past surgical history that includes Toe Surgery (Right, summer 2013).  His family history is not on file.  He reports that he has never smoked. He has never used smokeless tobacco. He reports that he does not drink alcohol or use illicit drugs.    ALLERGIES: He has No Known Allergies.    CURRENT MEDICATIONS: He has a current medication list which includes the following prescription(s): hydrocodone-acetaminophen and azithromycin.     REVIEW OF SYSTEMS: 3 point review of systems is negative except as noted above.     Exam:   /65  Pulse 51  Resp 16           CONSTITUTIONAL: healthy, alert and no distress  HEAD: Normocephalic. No masses, lesions, tenderness or abnormalities  SKIN: no suspicious lesions or rashes  GAIT: normal  NEUROLOGIC: Non-focal  PSYCHIATRIC: affect normal/bright and mentation appears normal.    MUSCULOSKELETAL: TTP along lateral aspect of first MT, approximately 1/3 from distal articulation. No pain with resisted great toe extension/flexion. No pain with foot dorsi/plantar flexion. No pain with inversion/eversion. No laxity.        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 He clinically is functional and is able to go about his ADLs without difficulty, but has evidence on exam and MRI of BSI.     Discussed that he should remain in boot while doing ADLs to provide maximum rest. Also advised that he could continue to run this weekend with the knowledge he may injure the area further. This is unlikely but still possible and he reported he understood this risk. Given his level of function and pain he likely can compete, but we advised he limit the amount of training and impact activities this week.     Will continue to follow and ask he RTC next Mon or Tues to evaluate how this weekend's events went in [place].     Ken Avelar MD  Primary Care Sports Medicine Fellow  February 3, 2018

## 2018-01-31 NOTE — PROGRESS NOTES
Attending Note:   I have personally examined this patient and have reviewed the clinical presentation and progress note with the fellow. I agree with the treatment plan as outlined. The plan was formulated with the fellow on the day of the patient's visit. I have reviewed all imaging with the fellow and agree with the findings in the documentation.     Brittni Jha MD, CAQ, CCD  [facility]  Sports Medicine and Bone Health

## 2018-02-06 ENCOUNTER — OFFICE VISIT (OUTPATIENT)
Dept: FAMILY MEDICINE | Facility: CLINIC | Age: 23
End: 2018-02-06
Payer: COMMERCIAL

## 2018-02-06 VITALS
WEIGHT: 179 LBS | HEIGHT: 75 IN | HEART RATE: 67 BPM | SYSTOLIC BLOOD PRESSURE: 142 MMHG | BODY MASS INDEX: 22.26 KG/M2 | DIASTOLIC BLOOD PRESSURE: 74 MMHG

## 2018-02-06 DIAGNOSIS — M79.671 RIGHT FOOT PAIN: Primary | ICD-10-CM

## 2018-02-06 DIAGNOSIS — M84.376D: ICD-10-CM

## 2018-02-06 NOTE — MR AVS SNAPSHOT
"              After Visit Summary   2/6/2018    Jackson R Wellenstein    MRN: [id number]           Patient Information     Date Of Birth          1995        Visit Information        Provider Department      2/6/2018 3:15 PM Brittni Jha MD [facility]        Today's Diagnoses     Right foot pain    -  1    Stress fracture of metatarsal with routine healing, subsequent encounter, unspecified laterality           Follow-ups after your visit        Who to contact     Please call your clinic at [phone] to:    Ask questions about your health    Make or cancel appointments    Discuss your medicines    Learn about your test results    Speak to your doctor            Additional Information About Your Visit        [employer] Information     [employer] gives you secure access to your electronic health record. If you see a primary care provider, you can also send messages to your care team and make appointments. If you have questions, please call your primary care clinic.  If you do not have a primary care provider, please call [phone] and they will assist you.      [employer] is an electronic gateway that provides easy, online access to your medical records. With [employer], you can request a clinic appointment, read your test results, renew a prescription or communicate with your care team.     To access your existing account, please contact your [facility] or call [phone] for assistance.        Care EveryWhere ID     This is your Care EveryWhere ID. This could be used by other organizations to access your [place] medical records  [id number]        Your Vitals Were     Pulse Height BMI (Body Mass Index)             67 6' 3\" (1.905 m) 22.37 kg/m2          Blood Pressure from Last 3 Encounters:   02/06/18 142/74   01/31/18 126/65   08/22/17 123/76    Weight from Last 3 Encounters:   02/06/18 179 lb (81.2 kg)   08/22/17 179 lb (81.2 kg)   06/15/17 " 170 lb (77.1 kg)               Primary Care Provider    None Specified       No primary provider on file.        Equal Access to Services     MIGUEL [facility], [facility] CampusSTANLEY : Hadii aad ku hadnuryslaury Yo, tresada rickielisbethha, kristian serrano jujumecca, ermelinda deein hayaapauline matuterosa m heinpattie hammer. So [facility] [phone].    ATENCIÓN: Si habla español, tiene a barrientos disposición servicios gratuitos de asistencia lingüística. Llame al [phone].    We comply with applicable federal civil rights laws and [place] laws. We do not discriminate on the basis of race, color, national origin, age, disability, sex, sexual orientation, or gender identity.            Thank you!     Thank you for choosing [facility]  for your care. Our goal is always to provide you with excellent care. Hearing back from our patients is one way we can continue to improve our services. Please take a few minutes to complete the written survey that you may receive in the mail after your visit with us. Thank you!             Your Updated Medication List - Protect others around you: Learn how to safely use, store and throw away your medicines at www.disposemymeds.org.          This list is accurate as of 2/6/18 11:59 PM.  Always use your most recent med list.                   Brand Name Dispense Instructions for use Diagnosis    azithromycin 250 MG tablet    ZITHROMAX Z-VERONICA    6 tablet    Two pill orally day one, one pill orally days 2-5    Acute non-recurrent frontal sinusitis       HYDROcodone-acetaminophen 5-325 MG per tablet    NORCO    20 tablet    Take 1-2 tablets by mouth every 4 hours as needed for moderate to severe pain    Rupture of rectus femoris tendon, left, subsequent encounter

## 2018-02-06 NOTE — LETTER
Date:February 9, 2018      Patient was self referred, no letter generated. Do not send.        [facility] Health Information

## 2018-02-06 NOTE — LETTER
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  He has a first metatarsal bone stress injury.  He was able to be in the boot and then compete this weekend and did fairly well.  His pain is definitely overall better.  He has not gotten the labs or the DEXA as of yet.  He is doing well otherwise.  He has been wearing the boot.  He was not limping at all out of the boot.  Racing did not set him back in any way.  He had some mild pain in his racing cleats.      OBJECTIVE:  Pleasant, in no apparent distress.  His right foot reveals some minor tenderness to palpation over the mid shaft of the first metatarsal.  There is no swelling.      ASSESSMENT:  Right foot first metatarsal bone stress injury.      PLAN:  At this time, he will run and do some training this week on Thursday.  He will run in a race on Saturday.  He will then try to run 2 times the following week, Tuesday and Friday.  He will remain in the boot.  If that all goes well, he will then run one time the following week and leave on [place], where he will compete on Friday.  If he is having increasing soreness or difficulties, then he will back off and do all cross-training with the very most minimal amount of running needed to be prepared for racing.  He will try to get the labs this week and the DEXA as well.  We will get updates from his ATC, Valdo Serrano, after this weekend.        Valdo Serrano was present for the last third of the appointment.     Brittni Jha MD, CAQ, FACSM, CCD  [facility]  Sports Medicine and Bone Health  Team Physician;  Athletics      Brittni Jha MD

## 2018-02-06 NOTE — PROGRESS NOTES
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  He has a first metatarsal bone stress injury.  He was able to be in the boot and then compete this weekend and did fairly well.  His pain is definitely overall better.  He has not gotten the labs or the DEXA as of yet.  He is doing well otherwise.  He has been wearing the boot.  He was not limping at all out of the boot.  Racing did not set him back in any way.  He had some mild pain in his racing cleats.      OBJECTIVE:  Pleasant, in no apparent distress.  His right foot reveals some minor tenderness to palpation over the mid shaft of the first metatarsal.  There is no swelling.      ASSESSMENT:  Right foot first metatarsal bone stress injury.      PLAN:  At this time, he will run and do some training this week on Thursday.  He will run in a race on Saturday.  He will then try to run 2 times the following week, Tuesday and Friday.  He will remain in the boot.  If that all goes well, he will then run one time the following week and leave on Wednesday for [place], where he will compete on Friday.  If he is having increasing soreness or difficulties, then he will back off and do all cross-training with the very most minimal amount of running needed to be prepared for racing.  He will try to get the labs this week and the DEXA as well.  We will get updates from his ATC, Valdo Serrano, after this weekend.        Valdo Serrano was present for the last third of the appointment.     Brittni Jha MD, CAQ, FACSM, CCD  [facility]  Sports Medicine and Bone Health  Team Physician;  Athletics

## 2018-02-08 ENCOUNTER — RADIANT APPOINTMENT (OUTPATIENT)
Dept: BONE DENSITY | Facility: CLINIC | Age: 23
End: 2018-02-08
Attending: FAMILY MEDICINE
Payer: COMMERCIAL

## 2018-02-08 DIAGNOSIS — R93.7 ABNORMAL BONE DENSITY SCREENING: ICD-10-CM

## 2018-02-08 DIAGNOSIS — M84.376D: ICD-10-CM

## 2018-02-08 LAB — CALCIUM SERPL-MCNC: 9.1 MG/DL (ref 8.5–10.1)

## 2018-02-09 LAB — DEPRECATED CALCIDIOL+CALCIFEROL SERPL-MC: 56 UG/L (ref 20–75)

## 2018-03-20 ENCOUNTER — OFFICE VISIT (OUTPATIENT)
Dept: FAMILY MEDICINE | Facility: CLINIC | Age: 23
End: 2018-03-20
Payer: COMMERCIAL

## 2018-03-20 DIAGNOSIS — M79.671 RIGHT FOOT PAIN: Primary | ICD-10-CM

## 2018-03-20 NOTE — LETTER
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  He has done very well with this.  He has been entirely off the week of spring break.  He is not really having any significant difficulties or pain from it.  At [employer] 2 weeks ago, he was sore after he ran, but he ran well himself individually, but the relay team that he was a part of did not have a good race.  He has some crunching occasionally around his 1st-2nd toes.  He has had this a few times since the stress injury.      OBJECTIVE:  Pleasant, in no apparent distress.  Vital signs as listed.  His right foot, there is no swelling.  He is really nontender to palpation except for some minor tenderness along the 2nd distal metatarsal tibial side.  There is no palpable callus in this area.  He has good 1st MTP range of motion that is nontender here.  He is nontender over his 3rd metatarsal.      ASSESSMENT:     1.  Healing 3rd metatarsal stress reaction.   2.  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      PLAN:  At this time, we will obtain x-rays and make these weightbearing to further assess his MTP joint and to look for hallux valgus.  If his x-rays show evidence of a healing stress injury in the 3rd metatarsal, then we will stop there with imaging.  If not, he will have an MRI of his foot.  We would like to know at this point in the outdoor season how hard he can begin to train.  The current plan is that he would do Monday as a nonimpact day, and he would have another nonimpact day per week as well to hopefully help keep him from new injury in terms of bone stress injuries.  He is comfortable with this plan.  I will discuss his case with Valdo Serrano ATC for  [employer] track [employer] field.     Brittni Jha MD, CAQ, FACSM, CCD  [facility]  Sports  Medicine and Bone Health  Team Physician;  Athletics      Brittni Jha MD

## 2018-03-20 NOTE — PROGRESS NOTES
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  He has done very well with this.  He has been entirely off the week of spring break.  He is not really having any significant difficulties or pain from it.  At [employer] 2 weeks ago, he was sore after he ran, but he ran well himself individually, but the relay team that he was a part of did not have a good race.  He has some crunching occasionally around his 1st-2nd toes.  He has had this a few times since the stress injury.      OBJECTIVE:  Pleasant, in no apparent distress.  Vital signs as listed.  His right foot, there is no swelling.  He is really nontender to palpation except for some minor tenderness along the 2nd distal metatarsal tibial side.  There is no palpable callus in this area.  He has good 1st MTP range of motion that is nontender here.  He is nontender over his 3rd metatarsal.      ASSESSMENT:     1.  Healing 3rd metatarsal stress reaction.   2.  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      PLAN:  At this time, we will obtain x-rays and make these weightbearing to further assess his MTP joint and to look for hallux valgus.  If his x-rays show evidence of a healing stress injury in the 3rd metatarsal, then we will stop there with imaging.  If not, he will have an MRI of his foot.  We would like to know at this point in the outdoor season how hard he can begin to train.  The current plan is that he would do Monday as a nonimpact day, and he would have another nonimpact day per week as well to hopefully help keep him from new injury in terms of bone stress injuries.  He is comfortable with this plan.  I will discuss his case with Valdo Serrano ATC for  [employer] track [employer] field.     Brittni Jha MD, CAQ, FACSM, CCD  [facility]  Sports Medicine and Bone Health  Team Physician;  Athletics

## 2018-03-20 NOTE — LETTER
Date:March 23, 2018      Patient was self referred, no letter generated. Do not send.        [facility] Health Information

## 2018-03-20 NOTE — MR AVS SNAPSHOT
After Visit Summary   3/20/2018    Jackson R Wellenstein    MRN: [id number]           Patient Information     Date Of Birth          1995        Visit Information        Provider Department      3/20/2018 4:00 PM Brittni Jha MD [facility]        Today's Diagnoses     Right foot pain    -  1       Follow-ups after your visit        Who to contact     Please call your clinic at [phone] to:    Ask questions about your health    Make or cancel appointments    Discuss your medicines    Learn about your test results    Speak to your doctor            Additional Information About Your Visit        MyChart Information     [employer] gives you secure access to your electronic health record. If you see a primary care provider, you can also send messages to your care team and make appointments. If you have questions, please call your primary care clinic.  If you do not have a primary care provider, please call [phone] and they will assist you.      [employer] is an electronic gateway that provides easy, online access to your medical records. With [employer], you can request a clinic appointment, read your test results, renew a prescription or communicate with your care team.     To access your existing account, please contact your [facility] or call [phone] for assistance.        Care EveryWhere ID     This is your Care EveryWhere ID. This could be used by other organizations to access your [place] medical records  [id number]         Blood Pressure from Last 3 Encounters:   02/06/18 142/74   01/31/18 126/65   08/22/17 123/76    Weight from Last 3 Encounters:   02/06/18 179 lb (81.2 kg)   08/22/17 179 lb (81.2 kg)   06/15/17 170 lb (77.1 kg)               Primary Care Provider    None Specified       No primary provider on file.        Equal Access to Services     BELEN INMAN : lynn Staley,  kristian jeffersonaleddie barotto deein hayaan adeeg kharash la'aan ah. So [facility] [phone].    ATENCIÓN: Si kota joshi, tiene a barrientos disposición servicios gratuitos de asistencia lingüística. Martin al [phone].    We comply with applicable federal civil rights laws and [place] laws. We do not discriminate on the basis of race, color, national origin, age, disability, sex, sexual orientation, or gender identity.            Thank you!     Thank you for choosing [facility]  for your care. Our goal is always to provide you with excellent care. Hearing back from our patients is one way we can continue to improve our services. Please take a few minutes to complete the written survey that you may receive in the mail after your visit with us. Thank you!             Your Updated Medication List - Protect others around you: Learn how to safely use, store and throw away your medicines at www.disposemymeds.org.          This list is accurate as of 3/20/18 11:59 PM.  Always use your most recent med list.                   Brand Name Dispense Instructions for use Diagnosis    azithromycin 250 MG tablet    ZITHROMAX Z-VERONICA    6 tablet    Two pill orally day one, one pill orally days 2-5    Acute non-recurrent frontal sinusitis       HYDROcodone-acetaminophen 5-325 MG per tablet    NORCO    20 tablet    Take 1-2 tablets by mouth every 4 hours as needed for moderate to severe pain    Rupture of rectus femoris tendon, left, subsequent encounter

## 2018-03-21 ENCOUNTER — RADIANT APPOINTMENT (OUTPATIENT)
Dept: GENERAL RADIOLOGY | Facility: CLINIC | Age: 23
End: 2018-03-21
Attending: FAMILY MEDICINE
Payer: COMMERCIAL

## 2018-03-21 DIAGNOSIS — M79.671 RIGHT FOOT PAIN: ICD-10-CM

## 2018-09-17 ENCOUNTER — OFFICE VISIT (OUTPATIENT)
Dept: ORTHOPEDICS | Facility: CLINIC | Age: 23
End: 2018-09-17
Payer: COMMERCIAL

## 2018-09-17 DIAGNOSIS — M79.671 RIGHT FOOT PAIN: Primary | ICD-10-CM

## 2018-09-17 NOTE — MR AVS SNAPSHOT
After Visit Summary   9/17/2018    Jackson R Wellenstein    MRN: [id number]           Patient Information     Date Of Birth          1995        Visit Information        Provider Department      9/17/2018 5:15 PM Lambert Hunter MD [facility]        Today's Diagnoses     Right foot pain    -  1       Follow-ups after your visit        Your next 10 appointments already scheduled     Sep 20, 2018  2:30 PM CDT   MR FOOT RIGHT W/O CONTRAST with [id number]   [facility] [place] [facility] ([facility])    [address] [address] [phone]           Take your medicines as usual, unless your doctor tells you not to. Bring a list of your current medicines to your exam (including vitamins, minerals and over-the-counter drugs). Also bring the results of similar scans you may have had.  Please remove any body piercings and hair extensions before you arrive.  Follow your doctor s orders. If you do not, we may have to postpone your exam.  You may or may not receive IV contrast for this exam pending the discretion of the Radiologist.  You do not need to do anything special to prepare.  The MRI machine uses a strong magnet. Please wear clothes without metal (snaps, zippers). A sweatsuit works well, or we may give you a hospital gown.   **IMPORTANT** THE INSTRUCTIONS BELOW ARE ONLY FOR THOSE PATIENTS WHO HAVE BEEN PRESCRIBED SEDATION OR GENERAL ANESTHESIA DURING THEIR MRI PROCEDURE:  IF YOUR DOCTOR PRESCRIBED ORAL SEDATION (take medicine to help you relax during your exam):   You must get the medicine from your doctor (oral medication) before you arrive. Bring the medicine to the exam. Do not take it at home. You ll be told when to take it upon arriving for your exam.   Arrive one hour early. Bring someone who can take you home after the test. Your medicine will make you sleepy. After the exam, you may not drive, take a  bus or take a taxi by yourself.  IF YOUR DOCTOR PRESCRIBED IV SEDATION:   Arrive one hour early. Bring someone who can take you home after the test. Your medicine will make you sleepy. After the exam, you may not drive, take a bus or take a taxi by yourself.   No eating 6 hours before your exam. You may have clear liquids up until 4 hours before your exam. (Clear liquids include water, clear tea, black coffee and fruit juice without pulp.)  IF YOUR DOCTOR PRESCRIBED ANESTHESIA (be asleep for your exam):   Arrive 1 1/2 hours early. Bring someone who can take you home after the test. You may not drive, take a bus or take a taxi by yourself.   No eating 8 hours before your exam. You may have clear liquids up until 4 hours before your exam. (Clear liquids include water, clear tea, black coffee and fruit juice without pulp.)   You will spend four to five hours in the recovery room.  Please call the Imaging Department at your exam site with any questions.            Sep 20, 2018  3:15 PM CDT   XR FOOT RIGHT G/E 3 VIEWS with [id number]   [facility]    [address]  [facility] [address] [phone]           How do I prepare for my exam? (Food and drink instructions) No Food and Drink Restrictions.  How do I prepare for my exam? (Other instructions) You do not need to do anything special for this exam.  What should I wear: Wear comfortable clothes.  How long does the exam take: Most scans take less than 5 minutes.  What should I bring: Bring a list of your medicines, including vitamins, minerals and over-the-counter drugs. It is safest to leave personal items at home.  Do I need a :  No  is needed.  What do I need to tell my doctor: Tell your doctor if there s any chance you are pregnant.  What should I do after the exam: No restrictions, You may resume normal activities.  What is this test: An image of a specific body part shown in  shades of black and white.  Who should I call with questions: If you have any questions, please call the Imaging Department where you will have your exam. Directions, parking instructions, and other information is available on our website, [email or web address].              Who to contact     Please call your clinic at [phone] to:    Ask questions about your health    Make or cancel appointments    Discuss your medicines    Learn about your test results    Speak to your doctor            Additional Information About Your Visit        [employer] Information     [employer] gives you secure access to your electronic health record. If you see a primary care provider, you can also send messages to your care team and make appointments. If you have questions, please call your primary care clinic.  If you do not have a primary care provider, please call [phone] and they will assist you.      [employer] is an electronic gateway that provides easy, online access to your medical records. With [employer], you can request a clinic appointment, read your test results, renew a prescription or communicate with your care team.     To access your existing account, please contact your [facility] or call [phone] for assistance.        Care EveryWhere ID     This is your Care EveryWhere ID. This could be used by other organizations to access your [place] medical records  [id number]         Blood Pressure from Last 3 Encounters:   02/06/18 142/74   01/31/18 126/65   08/22/17 123/76    Weight from Last 3 Encounters:   02/06/18 81.2 kg (179 lb)   08/22/17 81.2 kg (179 lb)   06/15/17 77.1 kg (170 lb)              Today, you had the following     No orders found for display       Primary Care Provider    None Specified       No primary provider on file.        Equal Access to Services     BELEN INMAN : lynn Staley, ermelinda colon  jarrell de león ah. So [facility] [phone].    ATENCIÓN: Si kota joshi, tiene a barrientos disposición servicios gratuitos de asistencia lingüística. Martin al [phone].    We comply with applicable federal civil rights laws and [place] laws. We do not discriminate on the basis of race, color, national origin, age, disability, sex, sexual orientation, or gender identity.            Thank you!     Thank you for choosing [facility] A[facility]  for your care. Our goal is always to provide you with excellent care. Hearing back from our patients is one way we can continue to improve our services. Please take a few minutes to complete the written survey that you may receive in the mail after your visit with us. Thank you!             Your Updated Medication List - Protect others around you: Learn how to safely use, store and throw away your medicines at www.disposemymeds.org.          This list is accurate as of 9/17/18 11:59 PM.  Always use your most recent med list.                   Brand Name Dispense Instructions for use Diagnosis    azithromycin 250 MG tablet    ZITHROMAX Z-VERONICA    6 tablet    Two pill orally day one, one pill orally days 2-5    Acute non-recurrent frontal sinusitis       HYDROcodone-acetaminophen 5-325 MG per tablet    NORCO    20 tablet    Take 1-2 tablets by mouth every 4 hours as needed for moderate to severe pain    Rupture of rectus femoris tendon, left, subsequent encounter

## 2018-09-17 NOTE — LETTER
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  This was not visualized on the plain radiographs.  It was encouraged that he seek a period of rest.  Unfortunately he had the upcoming [employer] meets and around that time he was not able take time off he was unable to complete the spring outdoor season though his foot kind of hurt the whole time.  Ultimately he never really took any period of rest and was able to participate through it.  He is now getting ready to begin his next season he notes still some complaints about his foot where it feels tight tone though it is clearly not as painful as it was before.  He would like to get this worked up to help her make a decision about whether he is going to consider red shirt this year versus pursuing his final year.    Exam today shows normal orientation of his foot.  He can stand on his tiptoes without difficulty.  His ankles and foot are stable.  And no definite or focal tenderness along the first second third or fourth or fifth metatarsals.  Toe motion and foot motion seems normal.    Clinical assessment:  Right foot stress fracture from approximately 1 year ago.  Improvement of clinical symptoms of some persist.    Plan:  We will get new standing foot films at this time we will also get an MRI of his foot with a marker.  Once this is complete I will review with the .  He is allowed to participate in the interim.    Lambert Hunter MD

## 2018-09-17 NOTE — LETTER
Date:September 21, 2018      Patient was self referred, no letter generated. Do not send.        [facility] Information

## 2018-09-20 ENCOUNTER — RADIANT APPOINTMENT (OUTPATIENT)
Dept: MRI IMAGING | Facility: CLINIC | Age: 23
End: 2018-09-20
Attending: ORTHOPAEDIC SURGERY
Payer: COMMERCIAL

## 2018-09-20 ENCOUNTER — RADIANT APPOINTMENT (OUTPATIENT)
Dept: GENERAL RADIOLOGY | Facility: CLINIC | Age: 23
End: 2018-09-20
Attending: ORTHOPAEDIC SURGERY
Payer: COMMERCIAL

## 2018-09-20 DIAGNOSIS — M79.671 RIGHT FOOT PAIN: ICD-10-CM

## 2018-09-24 ENCOUNTER — OFFICE VISIT (OUTPATIENT)
Dept: ORTHOPEDICS | Facility: CLINIC | Age: 23
End: 2018-09-24
Payer: COMMERCIAL

## 2018-09-24 VITALS
WEIGHT: 178 LBS | SYSTOLIC BLOOD PRESSURE: 132 MMHG | HEART RATE: 93 BPM | DIASTOLIC BLOOD PRESSURE: 72 MMHG | HEIGHT: 75 IN | BODY MASS INDEX: 22.13 KG/M2

## 2018-09-24 DIAGNOSIS — M79.671 RIGHT FOOT PAIN: Primary | ICD-10-CM

## 2018-09-24 NOTE — LETTER
Date:September 28, 2018      Patient was self referred, no letter generated. Do not send.        [facility] Information

## 2018-09-24 NOTE — MR AVS SNAPSHOT
"              After Visit Summary   9/24/2018    Jackson R Wellenstein    MRN: [id number]           Patient Information     Date Of Birth          1995        Visit Information        Provider Department      9/24/2018 4:15 PM Lambert Hunter MD [facility]        Today's Diagnoses     Right foot pain    -  1       Follow-ups after your visit        Who to contact     Please call your clinic at [phone] to:    Ask questions about your health    Make or cancel appointments    Discuss your medicines    Learn about your test results    Speak to your doctor            Additional Information About Your Visit        [employer] Information     [employer] gives you secure access to your electronic health record. If you see a primary care provider, you can also send messages to your care team and make appointments. If you have questions, please call your primary care clinic.  If you do not have a primary care provider, please call [phone] and they will assist you.      [employer] is an electronic gateway that provides easy, online access to your medical records. With [employer], you can request a clinic appointment, read your test results, renew a prescription or communicate with your care team.     To access your existing account, please contact your [facility] or call [phone] for assistance.        Care EveryWhere ID     This is your Care EveryWhere ID. This could be used by other organizations to access your [place] medical records  [id number]        Your Vitals Were     Pulse Height BMI (Body Mass Index)             93 1.892 m (6' 2.5\") 22.55 kg/m2          Blood Pressure from Last 3 Encounters:   09/24/18 132/72   02/06/18 142/74   01/31/18 126/65    Weight from Last 3 Encounters:   09/24/18 80.7 kg (178 lb)   02/06/18 81.2 kg (179 lb)   08/22/17 81.2 kg (179 lb)              Today, you had the following     No orders found for display       " Primary Care Provider    None Specified       No primary provider on file.        Equal Access to Services     BELEN INMAN : Hadii aad ku hadnuryslaury Ram, lynn maradiaga, eliceoketan branchmaermelinda willismirapattie hammer. So [facility] [phone].    ATENCIÓN: Si habla español, tiene a barrientos disposición servicios gratuitos de asistencia lingüística. [facility] [phone].    We comply with applicable federal civil rights laws and [place] laws. We do not discriminate on the basis of race, color, national origin, age, disability, sex, sexual orientation, or gender identity.            Thank you!     Thank you for choosing [facility]  for your care. Our goal is always to provide you with excellent care. Hearing back from our patients is one way we can continue to improve our services. Please take a few minutes to complete the written survey that you may receive in the mail after your visit with us. Thank you!             Your Updated Medication List - Protect others around you: Learn how to safely use, store and throw away your medicines at www.disposemymeds.org.          This list is accurate as of 9/24/18 11:59 PM.  Always use your most recent med list.                   Brand Name Dispense Instructions for use Diagnosis    azithromycin 250 MG tablet    ZITHROMAX Z-VERONICA    6 tablet    Two pill orally day one, one pill orally days 2-5    Acute non-recurrent frontal sinusitis       HYDROcodone-acetaminophen 5-325 MG per tablet    NORCO    20 tablet    Take 1-2 tablets by mouth every 4 hours as needed for moderate to severe pain    Rupture of rectus femoris tendon, left, subsequent encounter

## 2018-09-24 NOTE — LETTER
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  At that visit we reviewed his history of foot pain as well as his stress fracture/stress response to his foot.  He was given this diagnosis last spring he fully admits that he never really participated in a series of rest.  And is noted continued pain throughout this course of the last number of months.  He also fully admits that it is better now than it was before any more notes a little bit more tightness.  He does feel like he is altered his running gait a little bit.    He is currently in the decision about whether to pursue a red shirt the season and continuous training or to continue to compete.  This decision has not been made yet we elected to proceed with new imaging of his foot to see what anatomic structures appeared to be like prior to him having to make this decision he returns to clinic to discuss the results today.    He is currently participating in all activities.  He states that his foot does not feel perfectly normal and describes mostly a tightness through his foot though it is not nearly the same amount of pain that he had before.    Exam today shows normal structure of his foot.  No abnormalities on visual inspection no definite areas of PET tenderness to palpation.  Full motion of his hindfoot, midfoot, toes.    Plain films show no fractures dislocations no widening of the Lisfranc joint.  Normal plain radiographs.    MRI: Was reviewed today which does show some mild focal intramedullary marrow edema in the first metatarsal shaft this is in the area of the pain marker in the area of the previously noted stress reaction there is considerably less edema.  And both my and the radiologist shows that this is improved from its prior MRI.    A small  area of edema on the fourth metatarsal is also noted consistent with contusion.    Clinical assessment: Presumed resolving stress response right foot    Plan: I long discussion today with Shreyas as well as the .  In general I am encouraged by the results of this MRI.  There is clearly less edema in his foot I think this is good considering he is really provided no opportunity for rest over the last 9 months or so.  He does admit that he is gone somewhat easy over the last 4-6 weeks but in general he is pushed through this.  It is my thought that there is less fluid and edema on the first metatarsal that was previous and I do not think he is at significant risk of catastrophic fracture.  I think some of the edema that was seen in the fourth metatarsal is that he somewhat subconsciously rolls onto the lateral aspect of his foot to protect that first metatarsal.    I did discuss with him that this MRI is just a snapshot in time and it does not necessarily provide us the direction that this is going so it is possible that we are seeing the blooming of a new stress fracture however that is not currently consistent with his clinical history or examination.    At this time I have cleared him to participate in all activities I think any opportunity to continue to build and cross training would be very reasonable.  And I have encouraged this.  He and the athletic trainers voiced understanding of this as well.  Previously his vitamin D and calcium levels were assessed.  These were found to be normal.  The it is my understanding that the team undergoes replacement for these minerals once it starts to become darker here so I do not think that we need to further supplement him at this time though it certainly would defer that decision to 1 of our primary care sports medicine physicians.    He can continue to follow with me through the training room as necessary.    Lambert Hunter MD

## 2018-09-27 NOTE — PROGRESS NOTES
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  At that visit we reviewed his history of foot pain as well as his stress fracture/stress response to his foot.  He was given this diagnosis last spring he fully admits that he never really participated in a series of rest.  And is noted continued pain throughout this course of the last number of months.  He also fully admits that it is better now than it was before any more notes a little bit more tightness.  He does feel like he is altered his running gait a little bit.    He is currently in the decision about whether to pursue a red shirt the season and continuous training or to continue to compete.  This decision has not been made yet we elected to proceed with new imaging of his foot to see what anatomic structures appeared to be like prior to him having to make this decision he returns to clinic to discuss the results today.    He is currently participating in all activities.  He states that his foot does not feel perfectly normal and describes mostly a tightness through his foot though it is not nearly the same amount of pain that he had before.    Exam today shows normal structure of his foot.  No abnormalities on visual inspection no definite areas of PET tenderness to palpation.  Full motion of his hindfoot, midfoot, toes.    Plain films show no fractures dislocations no widening of the Lisfranc joint.  Normal plain radiographs.    MRI: Was reviewed today which does show some mild focal intramedullary marrow edema in the first metatarsal shaft this is in the area of the pain marker in the area of the previously noted stress reaction there is considerably less edema.  And both my and the radiologist shows that this is improved from its prior MRI.    A small area of edema on the fourth metatarsal is also noted consistent with  contusion.    Clinical assessment: Presumed resolving stress response right foot    Plan: I long discussion today with Shreyas as well as the .  In general I am encouraged by the results of this MRI.  There is clearly less edema in his foot I think this is good considering he is really provided no opportunity for rest over the last 9 months or so.  He does admit that he is gone somewhat easy over the last 4-6 weeks but in general he is pushed through this.  It is my thought that there is less fluid and edema on the first metatarsal that was previous and I do not think he is at significant risk of catastrophic fracture.  I think some of the edema that was seen in the fourth metatarsal is that he somewhat subconsciously rolls onto the lateral aspect of his foot to protect that first metatarsal.    I did discuss with him that this MRI is just a snapshot in time and it does not necessarily provide us the direction that this is going so it is possible that we are seeing the blooming of a new stress fracture however that is not currently consistent with his clinical history or examination.    At this time I have cleared him to participate in all activities I think any opportunity to continue to build and cross training would be very reasonable.  And I have encouraged this.  He and the athletic trainers voiced understanding of this as well.  Previously his vitamin D and calcium levels were assessed.  These were found to be normal.  The it is my understanding that the team undergoes replacement for these minerals once it starts to become darker here so I do not think that we need to further supplement him at this time though it certainly would defer that decision to 1 of our primary care sports medicine physicians.    He can continue to follow with me through the training room as necessary.

## 2018-10-10 DIAGNOSIS — R53.83 FATIGUE: Primary | ICD-10-CM

## 2018-10-11 DIAGNOSIS — R53.83 FATIGUE: ICD-10-CM

## 2018-10-11 LAB
FERRITIN SERPL-MCNC: 46 NG/ML (ref 26–388)
HGB BLD-MCNC: 13.8 G/DL (ref 13.3–17.7)

## 2018-12-09 ENCOUNTER — NURSE TRIAGE (OUTPATIENT)
Dept: NURSING | Facility: CLINIC | Age: 23
End: 2018-12-09

## 2018-12-09 ENCOUNTER — HOSPITAL ENCOUNTER (EMERGENCY)
Facility: CLINIC | Age: 23
Discharge: HOME | End: 2018-12-09
Attending: EMERGENCY MEDICINE | Admitting: EMERGENCY MEDICINE
Payer: COMMERCIAL

## 2018-12-09 ENCOUNTER — HOSPITAL ENCOUNTER (EMERGENCY)
Facility: CLINIC | Age: 23
End: 2018-12-09

## 2018-12-09 VITALS
HEIGHT: 75 IN | TEMPERATURE: 99.4 F | WEIGHT: 181 LBS | SYSTOLIC BLOOD PRESSURE: 123 MMHG | HEART RATE: 95 BPM | RESPIRATION RATE: 14 BRPM | BODY MASS INDEX: 22.5 KG/M2 | OXYGEN SATURATION: 99 % | DIASTOLIC BLOOD PRESSURE: 57 MMHG

## 2018-12-09 DIAGNOSIS — R11.2 NAUSEA AND VOMITING, INTRACTABILITY OF VOMITING NOT SPECIFIED, UNSPECIFIED VOMITING TYPE: ICD-10-CM

## 2018-12-09 DIAGNOSIS — R10.13 ABDOMINAL PAIN, EPIGASTRIC: ICD-10-CM

## 2018-12-09 DIAGNOSIS — K52.9 GASTROENTERITIS: ICD-10-CM

## 2018-12-09 LAB
ALBUMIN SERPL-MCNC: 4.1 G/DL (ref 3.4–5)
ALP SERPL-CCNC: 73 U/L (ref 40–150)
ALT SERPL W P-5'-P-CCNC: 27 U/L (ref 0–70)
ANION GAP SERPL CALCULATED.3IONS-SCNC: 8 MMOL/L (ref 3–14)
AST SERPL W P-5'-P-CCNC: 15 U/L (ref 0–45)
BASOPHILS # BLD AUTO: 0 10E9/L (ref 0–0.2)
BASOPHILS NFR BLD AUTO: 0 %
BILIRUB DIRECT SERPL-MCNC: 0.1 MG/DL (ref 0–0.2)
BILIRUB SERPL-MCNC: 0.6 MG/DL (ref 0.2–1.3)
BUN SERPL-MCNC: 14 MG/DL (ref 7–30)
CALCIUM SERPL-MCNC: 8.7 MG/DL (ref 8.5–10.1)
CHLORIDE SERPL-SCNC: 104 MMOL/L (ref 94–109)
CO2 SERPL-SCNC: 27 MMOL/L (ref 20–32)
CREAT SERPL-MCNC: 0.96 MG/DL (ref 0.66–1.25)
DIFFERENTIAL METHOD BLD: ABNORMAL
EOSINOPHIL # BLD AUTO: 0 10E9/L (ref 0–0.7)
EOSINOPHIL NFR BLD AUTO: 0 %
ERYTHROCYTE [DISTWIDTH] IN BLOOD BY AUTOMATED COUNT: 13 % (ref 10–15)
FLUAV+FLUBV AG SPEC QL: NEGATIVE
FLUAV+FLUBV AG SPEC QL: NEGATIVE
GFR SERPL CREATININE-BSD FRML MDRD: >90 ML/MIN/1.7M2
GLUCOSE SERPL-MCNC: 114 MG/DL (ref 70–99)
HCT VFR BLD AUTO: 44.8 % (ref 40–53)
HGB BLD-MCNC: 14.8 G/DL (ref 13.3–17.7)
IMM GRANULOCYTES # BLD: 0 10E9/L (ref 0–0.4)
IMM GRANULOCYTES NFR BLD: 0.2 %
LYMPHOCYTES # BLD AUTO: 0.4 10E9/L (ref 0.8–5.3)
LYMPHOCYTES NFR BLD AUTO: 2.9 %
MCH RBC QN AUTO: 30.8 PG (ref 26.5–33)
MCHC RBC AUTO-ENTMCNC: 33 G/DL (ref 31.5–36.5)
MCV RBC AUTO: 93 FL (ref 78–100)
MONOCYTES # BLD AUTO: 0.5 10E9/L (ref 0–1.3)
MONOCYTES NFR BLD AUTO: 4.1 %
NEUTROPHILS # BLD AUTO: 11.5 10E9/L (ref 1.6–8.3)
NEUTROPHILS NFR BLD AUTO: 92.8 %
NRBC # BLD AUTO: 0 10*3/UL
NRBC BLD AUTO-RTO: 0 /100
PLATELET # BLD AUTO: 239 10E9/L (ref 150–450)
POTASSIUM SERPL-SCNC: 3.6 MMOL/L (ref 3.4–5.3)
PROT SERPL-MCNC: 7.2 G/DL (ref 6.8–8.8)
RBC # BLD AUTO: 4.8 10E12/L (ref 4.4–5.9)
SODIUM SERPL-SCNC: 139 MMOL/L (ref 133–144)
SPECIMEN SOURCE: NORMAL
WBC # BLD AUTO: 12.4 10E9/L (ref 4–11)

## 2018-12-09 PROCEDURE — 99284 EMERGENCY DEPT VISIT MOD MDM: CPT | Mod: Z6 | Performed by: EMERGENCY MEDICINE

## 2018-12-09 PROCEDURE — 80048 BASIC METABOLIC PNL TOTAL CA: CPT | Performed by: EMERGENCY MEDICINE

## 2018-12-09 PROCEDURE — 96361 HYDRATE IV INFUSION ADD-ON: CPT | Performed by: EMERGENCY MEDICINE

## 2018-12-09 PROCEDURE — 99285 EMERGENCY DEPT VISIT HI MDM: CPT | Mod: 25 | Performed by: EMERGENCY MEDICINE

## 2018-12-09 PROCEDURE — 25000128 H RX IP 250 OP 636: Performed by: EMERGENCY MEDICINE

## 2018-12-09 PROCEDURE — 80076 HEPATIC FUNCTION PANEL: CPT | Performed by: EMERGENCY MEDICINE

## 2018-12-09 PROCEDURE — 85025 COMPLETE CBC W/AUTO DIFF WBC: CPT | Performed by: EMERGENCY MEDICINE

## 2018-12-09 PROCEDURE — 96374 THER/PROPH/DIAG INJ IV PUSH: CPT | Performed by: EMERGENCY MEDICINE

## 2018-12-09 PROCEDURE — 25000132 ZZH RX MED GY IP 250 OP 250 PS 637: Performed by: EMERGENCY MEDICINE

## 2018-12-09 PROCEDURE — 96375 TX/PRO/DX INJ NEW DRUG ADDON: CPT | Performed by: EMERGENCY MEDICINE

## 2018-12-09 PROCEDURE — 87804 INFLUENZA ASSAY W/OPTIC: CPT | Mod: 91 | Performed by: EMERGENCY MEDICINE

## 2018-12-09 RX ORDER — ONDANSETRON 4 MG/1
4 TABLET, ORALLY DISINTEGRATING ORAL EVERY 8 HOURS PRN
Qty: 8 TABLET | Refills: 0 | Status: SHIPPED | OUTPATIENT
Start: 2018-12-09

## 2018-12-09 RX ORDER — ONDANSETRON 4 MG/1
4 TABLET, ORALLY DISINTEGRATING ORAL EVERY 6 HOURS PRN
Qty: 10 TABLET | Refills: 0 | Status: SHIPPED | OUTPATIENT
Start: 2018-12-09 | End: 2018-12-12

## 2018-12-09 RX ORDER — SODIUM CHLORIDE 9 MG/ML
INJECTION, SOLUTION INTRAVENOUS CONTINUOUS
Status: DISCONTINUED | OUTPATIENT
Start: 2018-12-09 | End: 2018-12-09 | Stop reason: HOSPADM

## 2018-12-09 RX ORDER — METOCLOPRAMIDE HYDROCHLORIDE 5 MG/ML
5 INJECTION INTRAMUSCULAR; INTRAVENOUS ONCE
Status: COMPLETED | OUTPATIENT
Start: 2018-12-09 | End: 2018-12-09

## 2018-12-09 RX ORDER — ONDANSETRON 2 MG/ML
4 INJECTION INTRAMUSCULAR; INTRAVENOUS EVERY 30 MIN PRN
Status: DISCONTINUED | OUTPATIENT
Start: 2018-12-09 | End: 2018-12-09 | Stop reason: HOSPADM

## 2018-12-09 RX ORDER — ACETAMINOPHEN 325 MG/1
975 TABLET ORAL ONCE
Status: COMPLETED | OUTPATIENT
Start: 2018-12-09 | End: 2018-12-09

## 2018-12-09 RX ADMIN — SODIUM CHLORIDE 1000 ML: 9 INJECTION, SOLUTION INTRAVENOUS at 16:10

## 2018-12-09 RX ADMIN — SODIUM CHLORIDE: 9 INJECTION, SOLUTION INTRAVENOUS at 19:16

## 2018-12-09 RX ADMIN — METOCLOPRAMIDE 5 MG: 5 INJECTION, SOLUTION INTRAMUSCULAR; INTRAVENOUS at 18:00

## 2018-12-09 RX ADMIN — SODIUM CHLORIDE 1000 ML: 9 INJECTION, SOLUTION INTRAVENOUS at 18:00

## 2018-12-09 RX ADMIN — ACETAMINOPHEN 975 MG: 325 TABLET, FILM COATED ORAL at 19:16

## 2018-12-09 RX ADMIN — ONDANSETRON HYDROCHLORIDE 4 MG: 2 INJECTION, SOLUTION INTRAMUSCULAR; INTRAVENOUS at 16:10

## 2018-12-09 ASSESSMENT — ENCOUNTER SYMPTOMS
SHORTNESS OF BREATH: 0
CONFUSION: 0
FEVER: 0
BACK PAIN: 1
VOMITING: 1
BLOOD IN STOOL: 0
NECK STIFFNESS: 0
DIARRHEA: 1
DIFFICULTY URINATING: 0
ABDOMINAL PAIN: 1
EYE REDNESS: 0
COLOR CHANGE: 0
HEADACHES: 0
ARTHRALGIAS: 0
NAUSEA: 1
ANAL BLEEDING: 0

## 2018-12-09 ASSESSMENT — MIFFLIN-ST. JEOR: SCORE: 1906.64

## 2018-12-09 NOTE — ED PROVIDER NOTES
History     Chief Complaint   Patient presents with     Nausea & Vomiting     HPI  Jackson R Wellenstein is a 23 year old male who presents for evaluation of nausea, vomiting, and diarrhea. Patient complains of nausea, multiple episodes of vomiting, and mild diarrhea that began this morning around 5:30 AM. He has associated lightheadedness, mild abdominal pain, and back spasms secondary to vomiting. He denies fevers, cough, or hematemesis. Patient reports that Friday night he and several other people ate salad and chicken. He took leftovers of both foods home which he ate a lot of yesterday. He found out today that several other people were also ill with similar symptoms. No hx of abdominal surgery.     I have reviewed the Medications, Allergies, Past Medical and Surgical History, and Social History in the [facility] system.  History reviewed. No pertinent past medical history.    Past Surgical History:   Procedure Laterality Date     TOE SURGERY Right summer 2013    OCD removal       No family history on file.    Social History     Tobacco Use     Smoking status: Never Smoker     Smokeless tobacco: Never Used   Substance Use Topics     Alcohol use: No       Current Facility-Administered Medications   Medication     ondansetron (ZOFRAN) injection 4 mg     sodium chloride 0.9% infusion     Current Outpatient Medications   Medication     ondansetron (ZOFRAN ODT) 4 MG ODT tab      No Known Allergies    Review of Systems   Constitutional: Negative for fever.   HENT: Negative for congestion.    Eyes: Negative for redness.   Respiratory: Negative for shortness of breath.    Cardiovascular: Negative for chest pain.   Gastrointestinal: Positive for abdominal pain, diarrhea, nausea and vomiting. Negative for anal bleeding and blood in stool.   Genitourinary: Negative for difficulty urinating.   Musculoskeletal: Positive for back pain. Negative for arthralgias and neck stiffness.   Skin: Negative for color change.   Neurological:  "Negative for headaches.   Psychiatric/Behavioral: Negative for confusion.   All other systems reviewed and are negative.      Physical Exam   BP: 121/53  Pulse: 95  Temp: 99.4  F (37.4  C)  Resp: 14  Height: 190.5 cm (6' 3\")  Weight: 82.1 kg (181 lb)  SpO2: 95 %      Physical Exam   Constitutional: He is oriented to person, place, and time. He appears ill.   HENT:   Head: Normocephalic and atraumatic.   Eyes: Conjunctivae and EOM are normal. Pupils are equal, round, and reactive to light.   Neck: Normal range of motion. Neck supple.   Cardiovascular: Normal rate and regular rhythm. Exam reveals no gallop and no friction rub.   No murmur heard.  Pulmonary/Chest: Effort normal and breath sounds normal. No respiratory distress.   Abdominal: Soft. He exhibits no distension. There is no hepatosplenomegaly. There is tenderness in the right upper quadrant, epigastric area and left upper quadrant. There is no rigidity and no guarding.       Musculoskeletal: He exhibits no edema or tenderness.   Neurological: He is alert and oriented to person, place, and time. No cranial nerve deficit.   Skin: Skin is warm.   Psychiatric: He has a normal mood and affect. His behavior is normal. Judgment and thought content normal.   Nursing note and vitals reviewed.      ED Course     ED Course as of Dec 09 1944   Sun Dec 09, 2018   1703 Protein Total: 7.2   1708 WBC: (!) 12.4     Procedures       3:59 PM  The patient was seen and examined by Dr. Cuello in Room 4.          Critical Care time:  none             Labs Ordered and Resulted from Time of ED Arrival Up to the Time of Departure from the ED   CBC WITH PLATELETS DIFFERENTIAL - Abnormal; Notable for the following components:       Result Value    WBC 12.4 (*)     Absolute Neutrophil 11.5 (*)     Absolute Lymphocytes 0.4 (*)     All other components within normal limits   BASIC METABOLIC PANEL - Abnormal; Notable for the following components:    Glucose 114 (*)     All other " components within normal limits   HEPATIC PANEL   INFLUENZA A/B ANTIGEN            Assessments & Plan (with Medical Decision Making)   The patient has gastroneuritis symptoms after exposure to food that made others ill.  He appears dehydrated and quite rundown.  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  He also has 2 at a [employer] practices.  He was given several liters normal saline and Zofran followed by a round of Reglan for the abdominal cramping.  On recheck of the abdomen at 7:30 PM he still has some tenderness across the upper abdomen but states that he has waves of cramping pain there.  This is likely related to gastroenteritis and less likely colitis as he has no constant pain or blood or mucus in the diarrhea.  He was signed out to Dr. Lemus awaiting resolution of symptoms with fluids and Zofran.  If he does not feel better or is having worsening pain in the abdomen he will be admitted the observation unit, with whom I spoke about his history as a contingency plan.    I have reviewed the nursing notes.    I have reviewed the findings, diagnosis, plan and need for follow up with the patient.       Review of your medicines      START taking      Dose / Directions   ondansetron 4 MG ODT tab  Commonly known as:  ZOFRAN ODT      Dose:  4 mg  Take 1 tablet (4 mg) by mouth every 6 hours as needed for nausea  Quantity:  10 tablet  Refills:  0           Where to get your medicines      Some of these will need a paper prescription and others can be bought over the counter. Ask your nurse if you have questions.    Bring a paper prescription for each of these medications    ondansetron 4 MG ODT tab         Final diagnoses:   Gastroenteritis   Kinga AGUILAR, am serving as a trained medical scribe to document services personally performed by Brannon Cuello MD, based on the provider's statements to me.   IBrannon MD, was physically present and have reviewed  and verified the accuracy of this note documented by Kinga Zapata.      12/9/2018   [facility], [place], EMERGENCY DEPARTMENT     Ravinder Cuello MD  12/09/18 1944

## 2018-12-09 NOTE — ED TRIAGE NOTES
Friday evening had supper including chicken and lettuce salad. Started throwing up this morning. Other who were there have the same symptoms.

## 2018-12-09 NOTE — ED AVS SNAPSHOT
[facility], [place], Emergency Department  [address] [address]  Phone:  [phone]                                    Jackson R Wellenstein   MRN: [id number]    Department:  [facility], Emergency Department   Date of Visit:  12/9/2018           After Visit Summary Signature Page    I have received my discharge instructions, and my questions have been answered. I have discussed any challenges I see with this plan with the nurse or doctor.    ..........................................................................................................................................  Patient/Patient Representative Signature      ..........................................................................................................................................  Patient Representative Print Name and Relationship to Patient    ..................................................               ................................................  Date                                   Time    ..........................................................................................................................................  Reviewed by Signature/Title    ...................................................              ..............................................  Date                                               Time          22EPIC Rev 08/18

## 2018-12-09 NOTE — LETTER
December 9, 2018      To Whom It May Concern:      Jackson R Wellenstein was seen in our Emergency Department today, 12/09/18.  I expect his condition to improve over the next day.  He may return to work/school 12/11/2018.    Sincerely,        Frederick Villafana RN

## 2018-12-09 NOTE — TELEPHONE ENCOUNTER
"    Reason for Disposition    [1] SEVERE vomiting (e.g., 6 or more times/day) AND [2] present > 8 hours    Additional Information    Negative: Shock suspected (e.g., cold/pale/clammy skin, too weak to stand, low BP, rapid pulse)    Negative: Difficult to awaken or acting confused  (e.g., disoriented, slurred speech)    Negative: Sounds like a life-threatening emergency to the triager    Negative: Vomiting occurs only while coughing    Negative: [1] Pregnant < 20 Weeks AND [2] nausea/vomiting began in early pregnancy (i.e., 4-8 weeks pregnant)    Negative: Chest pain    Negative: [1] SEVERE headache AND [2] similar to prior migraines    Negative: [1] Vomiting AND [2] contains red blood or black (\"coffee ground\") material  (Exception: few red streaks in vomit that only happened once)    Negative: Severe pain in one eye    Negative: Recent head injury (within last 3 days)    Negative: Recent abdominal injury (within last 3 days)    Negative: [1] Insulin-dependent diabetes (Type I) AND [2] glucose > 400 mg/dl (22 mmol/l)    Answer Assessment - Initial Assessment Questions  1. VOMITING SEVERITY: \"How many times have you vomited in the past 24 hours?\"      - MILD:  1 - 2 times/day     - MODERATE: 3 - 5 times/day, decreased oral intake without significant weight loss or symptoms of dehydration     - SEVERE: 6 or more times/day, vomits everything or nearly everything, with significant weight loss, symptoms of dehydration       Severe, states he has vomited 15 times, after the first 2-3 times it has just been bile  2. ONSET: \"When did the vomiting begin?\"       Early this morning  3. FLUIDS: \"What fluids or food have you vomited up today?\" \"Have you been able to keep any fluids down?\"      Taking sips  4. ABDOMINAL PAIN: \"Are your having any abdominal pain?\" If yes : \"How bad is it and what does it feel like?\" (e.g., crampy, dull, intermittent, constant)       Back spasms, hips, and ribs hurt  5. DIARRHEA: \"Is there any " "diarrhea?\" If so, ask: \"How many times today?\"       x1  6. CONTACTS: \"Is there anyone else in the family with the same symptoms?\"       States 25 people got sick from the [employer] (student [employer])  7. CAUSE: \"What do you think is causing your vomiting?\"      ?food borne illness  8. HYDRATION STATUS: \"Any signs of dehydration?\" (e.g., dry mouth [not only dry lips], too weak to stand) \"When did you last urinate?\"      Dry mouth, weak and dizzy when he is up, only has been up to the bathroom, last urinated 3 hours ago  9. OTHER SYMPTOMS: \"Do you have any other symptoms?\" (e.g., fever, headache, vertigo, vomiting blood or coffee grounds)      Feels hot and cold  10. PREGNANCY: \"Is there any chance you are pregnant?\" \"When was your last menstrual period?\"        no    Protocols used: VOMITING-ADULT-AH      "

## 2018-12-10 NOTE — DISCHARGE INSTRUCTIONS
It is very important that you follow-up with your primary care doctor in 2 days to make sure that you are getting better and to see if any other test of treatments will be needed.  Please avoid any activity that causes pain.  If fevers nausea vomiting worse symptoms, or if any other concerns develop, please return to the emergency department immediately.  When patients present with symptoms like years follow-up is very important due to the fact that often times a single evaluation in the ER is not sufficient to accurately diagnose the cause of your symptoms.    Please make an appointment to follow up with Your Primary Care Provider as soon as possible even if entirely better.  Please remember that if you are not able to follow-up with your doctor you are welcome to return to the emergency department at any point.    Please make an appointment to follow up with [facility] (phone: [phone] as soon as possible even if entirely better.

## 2019-05-03 DIAGNOSIS — M25.512 PAIN IN SHOULDER REGION, LEFT: Primary | ICD-10-CM

## 2019-05-04 ENCOUNTER — ANCILLARY PROCEDURE (OUTPATIENT)
Dept: GENERAL RADIOLOGY | Facility: CLINIC | Age: 24
End: 2019-05-04
Attending: PEDIATRICS
Payer: COMMERCIAL

## 2019-05-04 DIAGNOSIS — M25.512 PAIN IN SHOULDER REGION, LEFT: ICD-10-CM

## 2019-05-06 DIAGNOSIS — S49.90XA INJURY OF CLAVICLE: Primary | ICD-10-CM

## 2019-05-07 ENCOUNTER — ANCILLARY PROCEDURE (OUTPATIENT)
Dept: MRI IMAGING | Facility: CLINIC | Age: 24
End: 2019-05-07
Attending: PEDIATRICS
Payer: COMMERCIAL

## 2019-05-07 DIAGNOSIS — S49.90XA INJURY OF CLAVICLE: ICD-10-CM

## 2019-05-09 ENCOUNTER — OFFICE VISIT (OUTPATIENT)
Dept: FAMILY MEDICINE | Facility: CLINIC | Age: 24
End: 2019-05-09
Payer: COMMERCIAL

## 2019-05-09 VITALS
DIASTOLIC BLOOD PRESSURE: 70 MMHG | HEIGHT: 75 IN | SYSTOLIC BLOOD PRESSURE: 123 MMHG | BODY MASS INDEX: 22.38 KG/M2 | WEIGHT: 180 LBS | HEART RATE: 47 BPM

## 2019-05-09 DIAGNOSIS — M75.82 PECTORALIS MAJOR TENDINITIS, LEFT: Primary | ICD-10-CM

## 2019-05-09 ASSESSMENT — ENCOUNTER SYMPTOMS
GASTROINTESTINAL NEGATIVE: 1
RESPIRATORY NEGATIVE: 1
MYALGIAS: 1
NEUROLOGICAL NEGATIVE: 1
CONSTITUTIONAL NEGATIVE: 1
FALLS: 0
BACK PAIN: 0
CARDIOVASCULAR NEGATIVE: 1
PSYCHIATRIC NEGATIVE: 1
NECK PAIN: 0

## 2019-05-09 ASSESSMENT — MIFFLIN-ST. JEOR: SCORE: 1902.1

## 2019-05-09 NOTE — LETTER
Date:May 10, 2019      Patient was self referred, no letter generated. Do not send.        [facility] Health Information

## 2019-05-09 NOTE — PROGRESS NOTES
HPI  In for f/u of MRI for L clavicle stress injury.  Actually as you see below he had a focal pec tear as it inserts on the inferior clavicle.  He does remember a hang clean that he did prior to the pain and swelling where he felt a pull/tear.  Feeling good now.  Normal motion.  Swelling and pain much improved.    Review of Systems   Constitutional: Negative.    HENT: Negative.    Respiratory: Negative.    Cardiovascular: Negative.    Gastrointestinal: Negative.    Musculoskeletal: Positive for joint pain and myalgias. Negative for back pain, falls and neck pain.   Skin: Negative.    Neurological: Negative.    Psychiatric/Behavioral: Negative.          Physical Exam  Swelling decreased from prior  No eccymosis  FROM  Normal strength  Mild TTP over superior pec as it attaches on inferior clavicle  Minimal bony TTP    Ortho Exam    Findings:     Pulsation and motion artifact compromise evaluation.     External marker approximately 7.5 cm lateral to left sternoclavicular  joint. Underlying marker, there is focal tear of the pectoralis major  muscle at the clavicular attachment. Adjacent periosteal edema without  bone marrow edema especially superior aspect of the clavicle,  relatively corresponding to the area of radiographically questioned  area of periostitis. There is no evidence for fracture in underlying  clavicle. No abnormal cortical signal. These findings may be secondary  to pectoralis injury or alternatively this may be stress reaction of  clavicle.       Internal derangement of joints are not well assessed owing to chosen  field of view.  Asymmetric widening and irregularity of the left  sternoclavicular joint especially medial clavicle where possible  underlying erosion is present. Findings may be secondary to prior  trauma but other considerations include inflammatory process given  apparent erosive changes such as rheumatoid arthritis or SAPHO  syndrome.     Partial visualization of T2 hyperintensity  along the left neck, does  not appear enlarged, may be reflecting clusters of non-enlarged lymph  nodes, not well assessed.                                                                      Impression:  1. Underlying the external marker, focal tear of the pectoralis major  muscle at the clavicular attachment.   a. Adjacent periosteal edema of superior clavicle may be secondary to  pectoralis major injury vs. stress reaction of clavicle although there  is no abnormal bone marrow signal or fracture of clavicle i.e. if  stress reaction of bone, it may be lower grade.  2. Asymmetric widening and irregularity of the left sternoclavicular  joint. Findings may be secondary to prior trauma but other  considerations include inflammatory process given apparent erosive  changes such as rheumatoid arthritis or SAPHO syndrome.  3. Partial visualization of T2 hyperintensity along the left neck,  does not appear enlarged, may be reflecting clusters of non-enlarged  lymph nodes, not well assessed given this area is not fully included  in the field of view.     I have personally reviewed the examination and initial interpretation  and I agree with the findings.     ALEXIS CALLEJAS    A/P  Pec Major focal tear  Probably some clavicle stress injury as well  -discussed needing 4-6 weeks to allow this injury to heal and not do aggravating lifting or activities  -will review MRI with ORtho as well to make sure no other management is needed  -will f/u after he is gone on vacation in 4 weeks to recheck and talk about gradual ramp up of upper body activity

## 2019-05-09 NOTE — LETTER
5/9/2019      RE: Jackson R Wellenstein  [address]  [facility] [address]       HPI  In for f/u of MRI for L clavicle stress injury.  Actually as you see below he had a focal pec tear as it inserts on the inferior clavicle.  He does remember a hang clean that he did prior to the pain and swelling where he felt a pull/tear.  Feeling good now.  Normal motion.  Swelling and pain much improved.    Review of Systems   Constitutional: Negative.    HENT: Negative.    Respiratory: Negative.    Cardiovascular: Negative.    Gastrointestinal: Negative.    Musculoskeletal: Positive for joint pain and myalgias. Negative for back pain, falls and neck pain.   Skin: Negative.    Neurological: Negative.    Psychiatric/Behavioral: Negative.          Physical Exam  Swelling decreased from prior  No eccymosis  FROM  Normal strength  Mild TTP over superior pec as it attaches on inferior clavicle  Minimal bony TTP    Ortho Exam    Findings:     Pulsation and motion artifact compromise evaluation.     External marker approximately 7.5 cm lateral to left sternoclavicular  joint. Underlying marker, there is focal tear of the pectoralis major  muscle at the clavicular attachment. Adjacent periosteal edema without  bone marrow edema especially superior aspect of the clavicle,  relatively corresponding to the area of radiographically questioned  area of periostitis. There is no evidence for fracture in underlying  clavicle. No abnormal cortical signal. These findings may be secondary  to pectoralis injury or alternatively this may be stress reaction of  clavicle.       Internal derangement of joints are not well assessed owing to chosen  field of view.  Asymmetric widening and irregularity of the left  sternoclavicular joint especially medial clavicle where possible  underlying erosion is present. Findings may be secondary to prior  trauma but other considerations include inflammatory process given  apparent erosive changes such as  rheumatoid arthritis or SAPHO  syndrome.     Partial visualization of T2 hyperintensity along the left neck, does  not appear enlarged, may be reflecting clusters of non-enlarged lymph  nodes, not well assessed.                                                                      Impression:  1. Underlying the external marker, focal tear of the pectoralis major  muscle at the clavicular attachment.   a. Adjacent periosteal edema of superior clavicle may be secondary to  pectoralis major injury vs. stress reaction of clavicle although there  is no abnormal bone marrow signal or fracture of clavicle i.e. if  stress reaction of bone, it may be lower grade.  2. Asymmetric widening and irregularity of the left sternoclavicular  joint. Findings may be secondary to prior trauma but other  considerations include inflammatory process given apparent erosive  changes such as rheumatoid arthritis or SAPHO syndrome.  3. Partial visualization of T2 hyperintensity along the left neck,  does not appear enlarged, may be reflecting clusters of non-enlarged  lymph nodes, not well assessed given this area is not fully included  in the field of view.     I have personally reviewed the examination and initial interpretation  and I agree with the findings.     ALEXIS CALLEJAS    A/P  Pec Major focal tear  Probably some clavicle stress injury as well  -discussed needing 4-6 weeks to allow this injury to heal and not do aggravating lifting or activities  -will review MRI with ORtho as well to make sure no other management is needed  -will f/u after he is gone on vacation in 4 weeks to recheck and talk about gradual ramp up of upper body activity    Sohan Mchugh MD

## 2019-06-18 ENCOUNTER — OFFICE VISIT (OUTPATIENT)
Dept: FAMILY MEDICINE | Facility: CLINIC | Age: 24
End: 2019-06-18
Payer: COMMERCIAL

## 2019-06-18 VITALS
WEIGHT: 180 LBS | BODY MASS INDEX: 22.38 KG/M2 | HEIGHT: 75 IN | DIASTOLIC BLOOD PRESSURE: 80 MMHG | SYSTOLIC BLOOD PRESSURE: 129 MMHG | HEART RATE: 63 BPM

## 2019-06-18 DIAGNOSIS — R11.0 NAUSEA: Primary | ICD-10-CM

## 2019-06-18 ASSESSMENT — MIFFLIN-ST. JEOR: SCORE: 1902.1

## 2019-06-18 NOTE — PROGRESS NOTES
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     He reports nausea (worse in AM but also sporadic throughout day, especially at meal times) for the past week but worse over the past 2 days. Sometimes the nausea starts right away as soon as he starts eating, even with plain bagel. He has no dietary changes. He is drinking lots of water. The past 2 mornings he has vomited after workouts (not very intense workouts).     No fever, fatigue, muscle pain, breathing difficulties, or sore throat. No constipation or diarrhea. No increased stress, he is actually feeling more relaxed over the past month. He has never had GI issues like this. No friends with similar symptoms. Sleeping OK 7-8 hours per night.     About 3 weeks ago he did start taking Citrulline and Arginine supplements. He reports he did take these last summer as well and had no issues.     He did recently go on an RV road trip to [place] with 6 friends (May 28-June 9). No one else is sick from his group. They did camp and either had boiled water or bought water bottles.     Some family history of mild gluten allergy. He has never had an issue with gluten.     O: /80   Pulse 63   Ht 1.905 m (6' 3\")   Wt 81.6 kg (180 lb)   BMI 22.50 kg/m       GEN: no acute distress  HEENT: NCAT. EOMI. Sclera clear  RESP: CTAB. No wheezing, rales, rhonchi.   CV: S1/S2. No murmur. Warm and well-perfused  ABD: bowel sounds present. Soft. Not tender. Not distended    A/P:  1. Nausea  -unsure of etiology at this point: new supplement vs mild illness vs GI infection from camping water  -at this point he will try symptomatic management (TUMs, pepcid) for GI upset for the remainder of this week  -if nausea persists, he will discontinue use of arginine and citrulline (nausea is listed as a potential side effect)  -if nausea continues to persist next week, will come in for further evaluation  -unlikely that he has a parasitic, viral, or bacterial " infection from camping water, especially with no diarrhea, but may choose to perform labs in 7-10 days if symptoms persist   -will return sooner if symptoms worsen    Patient discussed with Dr. Yudelka Aguirre MD  Primary Care Sports Medicine Fellow

## 2019-06-18 NOTE — LETTER
Date:June 19, 2019      Patient was self referred, no letter generated. Do not send.        [facility] Health Information

## 2019-06-18 NOTE — PROGRESS NOTES
Attending Note:   I have discussed this patient and have reviewed the clinical presentation and progress note with the fellow. I agree with the treatment plan as outlined. The plan was formulated with the fellow on the day of the patient's visit.     Brittni Jha MD, CAQ, CCD  [facility]  Sports Medicine and Bone Health

## 2019-09-18 ENCOUNTER — OFFICE VISIT (OUTPATIENT)
Dept: ORTHOPEDICS | Facility: CLINIC | Age: 24
End: 2019-09-18
Payer: COMMERCIAL

## 2019-09-18 VITALS
WEIGHT: 172 LBS | HEART RATE: 70 BPM | SYSTOLIC BLOOD PRESSURE: 122 MMHG | BODY MASS INDEX: 21.39 KG/M2 | HEIGHT: 75 IN | DIASTOLIC BLOOD PRESSURE: 76 MMHG

## 2019-09-18 DIAGNOSIS — R07.89 OTHER CHEST PAIN: Primary | ICD-10-CM

## 2019-09-18 ASSESSMENT — MIFFLIN-ST. JEOR: SCORE: 1865.82

## 2019-09-18 NOTE — PROGRESS NOTES
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 Felt it was hard to breathe and get air in and felt light-headed. He stopped running and later felt better. No stridor, SOB, recent viral infection, no wheeze or h/o asthma. No sx at rest. Tried to run again yesterday and got half way through before similar sx developed. Does endorse anxiety and has been seeing Dr. Wharton in sports psych. Considering meds. Thinks this could be playing a role. Also has h/o GERD but feels like this is different. No cardiac family history.          Medications:     Current Outpatient Medications   Medication Sig     ondansetron (ZOFRAN ODT) 4 MG ODT tab Take 1 tablet (4 mg) by mouth every 8 hours as needed for nausea (Patient not taking: Reported on 9/18/2019)     No current facility-administered medications for this visit.              Allergies:    No Known Allergies         Review of Systems:   A comprehensive 10 point review of systems (constitutional, ENT, cardiac, peripheral vascular, respiratory, GI, , Musculoskeletal, skin, Neurological) was performed and found to be negative except as described in this note.           Physical Exam:   COMPLETE EXAMINATION:   VITAL SIGNS: /76   Pulse 70   Ht 1.905 m (6' 3\")   Wt 78 kg (172 lb)   BMI 21.50 kg/m    GEN: Alert, well-nourished, and in no distress.   HEENT:   Head: Normocephalic and atraumatic.   Eyes: PERRLA, EOMI  Nose: no congestion or discharge  Ears: TM's normal, external canals normal  Mouth/Throat: MMM, No erythema or exudate.   Neck: supple, no lymphadenopathy  CV: S1S2 normal, RRR, no murmur  CHEST: CTA, Easy effort, mild ttp along right costochondral border; + crepitus at end of inspiration and expiration in RUL - no rales or wheeze  ABD: " Soft. Nontender/nondistended, no HSM/mass, no rebound/guarding.  NEURO: Alert and oriented to person, place, and time. Gait normal.   SKIN: No rash, warmth or erythema  PSYCH: Mood, memory, affect and judgment normal.         Assessment:     Chest tightness with exertion - differential includes costochondritis, anxiety/VCD, cannot R/O spontaneous pneumothorax given sounds in RUL, doubt cardiac etiology        Plan:     1. CXR and ECG. If normal, monitor sx to discern if more c/w anxiety or costochondritis  2. No participation until above results known.    Mali Cazares M.D.    ATC Narcisa Barbosa was present for the entire visit.    9/19/19 Addendum: CXR and ECG normal. Monitor sx - if c/w anxiety/VCD, work on breath, counseling, consider speech therapy consult. If costochondritis seems more likely, trial NSAIDs.  May now participate without restriction. Discussed with ATC.    Mali Cazares MD

## 2019-09-18 NOTE — LETTER
Date:September 19, 2019      Patient was self referred, no letter generated. Do not send.        [facility] Health Information

## 2019-09-18 NOTE — LETTER
"  9/18/2019      RE: Jackson R Wellenstein  [address]  [facility] [address]       [facility] FOLLOW UP    Jackson R Wellenstein MRN# [id number]   Age: 23 year old YOB: 1995           Chief Complaint:     Chest pain          History of Present Illness:     24 yo [employer] developed midline chest tightness 2 days ago during a run. Felt it was hard to breathe and get air in and felt light-headed. He stopped running and later felt better. No stridor, SOB, recent viral infection, no wheeze or h/o asthma. No sx at rest. Tried to run again yesterday and got half way through before similar sx developed. Does endorse anxiety and has been seeing Dr. Wharton in sports psych. Considering meds. Thinks this could be playing a role. Also has h/o GERD but feels like this is different. No cardiac family history.          Medications:     Current Outpatient Medications   Medication Sig     ondansetron (ZOFRAN ODT) 4 MG ODT tab Take 1 tablet (4 mg) by mouth every 8 hours as needed for nausea (Patient not taking: Reported on 9/18/2019)     No current facility-administered medications for this visit.              Allergies:    No Known Allergies         Review of Systems:   A comprehensive 10 point review of systems (constitutional, ENT, cardiac, peripheral vascular, respiratory, GI, , Musculoskeletal, skin, Neurological) was performed and found to be negative except as described in this note.           Physical Exam:   COMPLETE EXAMINATION:   VITAL SIGNS: /76   Pulse 70   Ht 1.905 m (6' 3\")   Wt 78 kg (172 lb)   BMI 21.50 kg/m     GEN: Alert, well-nourished, and in no distress.   HEENT:   Head: Normocephalic and atraumatic.   Eyes: PERRLA, EOMI  Nose: no congestion or discharge  Ears: TM's normal, external canals normal  Mouth/Throat: MMM, No erythema or exudate.   Neck: supple, no lymphadenopathy  CV: S1S2 normal, RRR, no murmur  CHEST: CTA, Easy effort, mild ttp along right costochondral " border; + crepitus at end of inspiration and expiration in RUL - no rales or wheeze  ABD: Soft. Nontender/nondistended, no HSM/mass, no rebound/guarding.  NEURO: Alert and oriented to person, place, and time. Gait normal.   SKIN: No rash, warmth or erythema  PSYCH: Mood, memory, affect and judgment normal.         Assessment:     Chest tightness with exertion - differential includes costochondritis, anxiety/VCD, cannot R/O spontaneous pneumothorax given sounds in RUL, doubt cardiac etiology        Plan:     1. CXR and ECG. If normal, monitor sx to discern if more c/w anxiety or costochondritis  2. No participation until above results known.    Mali Cazares M.D.    JAMI Barbosa was present for the entire visit.      Mali Cazares MD

## 2019-09-19 ENCOUNTER — ANCILLARY PROCEDURE (OUTPATIENT)
Dept: GENERAL RADIOLOGY | Facility: CLINIC | Age: 24
End: 2019-09-19
Attending: PEDIATRICS
Payer: COMMERCIAL

## 2019-09-19 DIAGNOSIS — R07.89 OTHER CHEST PAIN: ICD-10-CM

## 2019-09-19 LAB — INTERPRETATION ECG - MUSE: NORMAL

## 2019-10-31 DIAGNOSIS — R53.83 FATIGUE, UNSPECIFIED TYPE: Primary | ICD-10-CM

## 2019-11-04 DIAGNOSIS — R53.83 FATIGUE, UNSPECIFIED TYPE: ICD-10-CM

## 2019-11-04 LAB
FERRITIN SERPL-MCNC: 66 NG/ML (ref 26–388)
HGB BLD-MCNC: 13 G/DL (ref 13.3–17.7)

## 2019-11-06 ENCOUNTER — OFFICE VISIT (OUTPATIENT)
Dept: ORTHOPEDICS | Facility: CLINIC | Age: 24
End: 2019-11-06
Payer: COMMERCIAL

## 2019-11-06 VITALS
HEART RATE: 53 BPM | HEIGHT: 75 IN | DIASTOLIC BLOOD PRESSURE: 64 MMHG | BODY MASS INDEX: 21.49 KG/M2 | WEIGHT: 172.8 LBS | SYSTOLIC BLOOD PRESSURE: 119 MMHG

## 2019-11-06 DIAGNOSIS — F32.0 CURRENT MILD EPISODE OF MAJOR DEPRESSIVE DISORDER WITHOUT PRIOR EPISODE (H): ICD-10-CM

## 2019-11-06 DIAGNOSIS — F41.1 GENERALIZED ANXIETY DISORDER: Primary | ICD-10-CM

## 2019-11-06 RX ORDER — ESCITALOPRAM OXALATE 10 MG/1
10 TABLET ORAL DAILY
Qty: 30 TABLET | Refills: 1 | Status: SHIPPED | OUTPATIENT
Start: 2019-11-06 | End: 2019-12-02

## 2019-11-06 ASSESSMENT — MIFFLIN-ST. JEOR: SCORE: 1869.45

## 2019-11-06 NOTE — PROGRESS NOTES
"[facility] CLINIC FOLLOW UP    Jackson R Wellenstein MRN# [id number]   Age: 23 year old YOB: 1995           Chief Complaint:     Mental health follow up          History of Present Illness:     22 yo [employer] here for mental health follow up. At our last visit in mid September, he was c/o midline chest tightness during a run. CXR and ECG normal. Since then, no further chest pain, but still feeling anxious and depressed. Worries about what he will do after school, relationships, school work, family. 1-2 days a week feels more depressed. Saw Dio in sports psych, but tools he was using were not helping. Has not been back. No SI. Would like to discuss medication today.    Having difficulty with sleep. Can take 30-45 min to fall asleep, wakes 2-3 x per night usually around 3am and then again later in the morning. Takes Unisom sometimes. Melatonin not helpful.     Having \"cluster headaches\". Pain localized over right brow. No vision changes, N, V. Occur 2-3 x/week. Some photophobia. No specific time of day or trigger. Does not occur with exercise. No h/o or Fhx migraine or headache disorder.          Medications:     Current Outpatient Medications   Medication Sig     escitalopram (LEXAPRO) 10 MG tablet Take 1 tablet (10 mg) by mouth daily     ondansetron (ZOFRAN ODT) 4 MG ODT tab Take 1 tablet (4 mg) by mouth every 8 hours as needed for nausea (Patient not taking: Reported on 9/18/2019)     No current facility-administered medications for this visit.              Allergies:    No Known Allergies         Review of Systems:   A comprehensive 10 point review of systems (constitutional, ENT, cardiac, peripheral vascular, respiratory, GI, , Musculoskeletal, skin, Neurological) was performed and found to be negative except as described in this note.           Physical Exam:   COMPLETE EXAMINATION:   VITAL SIGNS: /64   Pulse 53   Ht 1.905 m (6' 3\")   Wt 78.4 kg (172 lb 12.8 oz)   BMI 21.60 " kg/m    GEN: Alert, well-nourished, and in no distress.   NEURO: Alert and oriented to person, place, and time. Gait normal. No cranial nerve deficit.   SKIN: No rash, warmth or erythema  PSYCH: Memory, judgment normal. Affect flat and mood low.        Assessment:     Anxiety  Depression  Insomnia  Headaches        Plan:     1. Start Lexapro 10 mg x 1 week, then increase to 20 mg. Discussed R/B/SE.   2. Continue sports psych and self care measures  3. Follow up in 2-3 weeks. Recheck sleep and headaches at that time as well.    Mali Cazares M.D.    JAMI Barbosa was present for the entire visit.

## 2019-11-06 NOTE — LETTER
Date:November 8, 2019      Patient was self referred, no letter generated. Do not send.        [facility] Health Information

## 2019-11-06 NOTE — LETTER
"  11/6/2019      RE: Jackson R Wellenstein  [address]  [facility] [address]       [facility] FOLLOW UP    Jackson R Wellenstein MRN# [id number]   Age: 23 year old YOB: 1995           Chief Complaint:     Mental health follow up          History of Present Illness:     22 yo [employer] here for mental health follow up. At our last visit in mid September, he was c/o midline chest tightness during a run. CXR and ECG normal. Since then, no further chest pain, but still feeling anxious and depressed. Worries about what he will do after school, relationships, school work, family. 1-2 days a week feels more depressed. Saw Dio in sports psych, but tools he was using were not helping. Has not been back. No SI. Would like to discuss medication today.    Having difficulty with sleep. Can take 30-45 min to fall asleep, wakes 2-3 x per night usually around 3am and then again later in the morning. Takes Unisom sometimes. Melatonin not helpful.     Having \"cluster headaches\". Pain localized over right brow. No vision changes, N, V. Occur 2-3 x/week. Some photophobia. No specific time of day or trigger. Does not occur with exercise. No h/o or Fhx migraine or headache disorder.          Medications:     Current Outpatient Medications   Medication Sig     escitalopram (LEXAPRO) 10 MG tablet Take 1 tablet (10 mg) by mouth daily     ondansetron (ZOFRAN ODT) 4 MG ODT tab Take 1 tablet (4 mg) by mouth every 8 hours as needed for nausea (Patient not taking: Reported on 9/18/2019)     No current facility-administered medications for this visit.              Allergies:    No Known Allergies         Review of Systems:   A comprehensive 10 point review of systems (constitutional, ENT, cardiac, peripheral vascular, respiratory, GI, , Musculoskeletal, skin, Neurological) was performed and found to be negative except as described in this note.           Physical Exam:   COMPLETE EXAMINATION:   VITAL SIGNS: BP " "119/64   Pulse 53   Ht 1.905 m (6' 3\")   Wt 78.4 kg (172 lb 12.8 oz)   BMI 21.60 kg/m     GEN: Alert, well-nourished, and in no distress.   NEURO: Alert and oriented to person, place, and time. Gait normal. No cranial nerve deficit.   SKIN: No rash, warmth or erythema  PSYCH: Memory, judgment normal. Affect flat and mood low.        Assessment:     Anxiety  Depression  Insomnia  Headaches        Plan:     1. Start Lexapro 10 mg x 1 week, then increase to 20 mg. Discussed R/B/SE.   2. Continue sports psych and self care measures  3. Follow up in 2-3 weeks. Recheck sleep and headaches at that time as well.    Mali Cazares M.D.    JAMI Barbosa was present for the entire visit.      Mali Cazares MD    "

## 2019-11-19 ENCOUNTER — OFFICE VISIT (OUTPATIENT)
Dept: FAMILY MEDICINE | Facility: CLINIC | Age: 24
End: 2019-11-19
Payer: COMMERCIAL

## 2019-11-19 VITALS
BODY MASS INDEX: 21.14 KG/M2 | DIASTOLIC BLOOD PRESSURE: 71 MMHG | WEIGHT: 170 LBS | SYSTOLIC BLOOD PRESSURE: 118 MMHG | HEART RATE: 43 BPM | HEIGHT: 75 IN

## 2019-11-19 DIAGNOSIS — S76.012A MUSCLE STRAIN OF LEFT GLUTEAL REGION, INITIAL ENCOUNTER: ICD-10-CM

## 2019-11-19 DIAGNOSIS — J06.9 VIRAL URI WITH COUGH: Primary | ICD-10-CM

## 2019-11-19 RX ORDER — BENZONATATE 200 MG/1
200 CAPSULE ORAL 3 TIMES DAILY PRN
Qty: 30 CAPSULE | Refills: 0 | Status: SHIPPED | OUTPATIENT
Start: 2019-11-19

## 2019-11-19 RX ORDER — FLUTICASONE PROPIONATE 50 MCG
1 SPRAY, SUSPENSION (ML) NASAL DAILY
Qty: 9.9 ML | Refills: 0 | Status: SHIPPED | OUTPATIENT
Start: 2019-11-19

## 2019-11-19 ASSESSMENT — MIFFLIN-ST. JEOR: SCORE: 1851.74

## 2019-11-19 NOTE — PROGRESS NOTES
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    Has had a cough for the last 1.5 weeks.  Sometimes productive, but mostly irritated  Mucinex DM helps during the day, but it is worse at night  Has congestion as well and this makes things worse.  Has been having HA that he calls cluster HA.  Endorses mild sore throat.  No fever, chills, ear pain, nausea, AP, vomiting or diarrhea  Feels like things are draining down his throat.  No breathing difficulties or wheezing.  No asthma history.  Feels like things have not improved much, but cough has persisted  Sleeping very poorly (waking up at least 2 times a night)  Has tried Unisom and Melatonin to help sleep    Left hip has been bothering him  Located posteriorly along his glute medius  Took Fri-Sunday off and when he ran yesterday he had pain.  Glute had been tight and massage therapist notes tightness  Has not done any rehab with ATC  No weakness, numbness or tingling down his leg    O: /71   Pulse (!) 43   Ht 1.905 m (6' 3\")   Wt 77.1 kg (170 lb)   BMI 21.25 kg/m    GEN: NAD  HEENT: EOMI.  Sclera clear.  TMs normal bilaterally.  Turbinate hypertrophy.  MMM.  +PND. No tonsillar hypertrophy.  NECK: no significant LAD  CV: RRR  LUNGS: CTAB  LEFT HIP: FROM and strength.  Neg FADIR and HARVINDER.  TTP over glut medius.  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  Has been getting over a cold as well.  Vitals stable and he appears well.  Discussed likely post viral cough that will take some time to resolve completely.  Discussed OTC remedies.  In regard to his left hip pain, brief exam and history obtained today.  No red flags and does have tightness in his glute med.  Discussed talking with ATC for rehab exercises for now.    -Flonase BID for 1 week, then daily PRN  -Trial of anti-histamine to help with PND for the next 1-2 weeks  -Tessalon Perles TID PRN cough  -OTC remedies as " he has been  -Follow-up in 1 week to further discuss hip/glute    Mer Oliver DO  Primary Care Sports Medicine Fellow

## 2019-11-19 NOTE — LETTER
Date:November 20, 2019      Patient was self referred, no letter generated. Do not send.        [facility] Health Information

## 2019-11-19 NOTE — LETTER
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    Has had a cough for the last 1.5 weeks.  Sometimes productive, but mostly irritated  Mucinex DM helps during the day, but it is worse at night  Has congestion as well and this makes things worse.  Has been having HA that he calls cluster HA.  Endorses mild sore throat.  No fever, chills, ear pain, nausea, AP, vomiting or diarrhea  Feels like things are draining down his throat.  No breathing difficulties or wheezing.  No asthma history.  Feels like things have not improved much, but cough has persisted  Sleeping very poorly (waking up at least 2 times a night)  Has tried Unisom and Melatonin to help sleep    Left hip has been bothering him  Located posteriorly along his glute medius  Took Fri-Sunday off and when he ran yesterday he had pain.  Glute had been tight and massage therapist notes tightness  Has not done any rehab with ATC  No weakness, numbness or tingling down his leg    O: /71   Pulse (!) 43   Ht 1.905 m (6' 3\")   Wt 77.1 kg (170 lb)   BMI 21.25 kg/m     GEN: NAD  HEENT: EOMI.  Sclera clear.  TMs normal bilaterally.  Turbinate hypertrophy.  MMM.  +PND. No tonsillar hypertrophy.  NECK: no significant LAD  CV: RRR  LUNGS: CTAB  LEFT HIP: FROM and strength.  Neg FADIR and HARVINDER.  TTP over glut medius.  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  Has been getting over a cold as well.  Vitals stable and he appears well.  Discussed likely post viral cough that will take some time to resolve completely.  Discussed OTC remedies.  In regard to his left hip pain, brief exam and history obtained today.  No red flags and does have tightness in his glute med.  Discussed talking with ATC for rehab exercises for now.    -Flonase BID for 1 week, then daily PRN  -Trial of anti-histamine " to help with PND for the next 1-2 weeks  -Tessalon Perles TID PRN cough  -OTC remedies as he has been  -Follow-up in 1 week to further discuss hip/glute    Mer Oliver DO  Primary Care Sports Medicine Fellow      Attending Note:   I have discussed this patient and have reviewed the clinical presentation and progress note with the fellow. I agree with the treatment plan as outlined. The plan was formulated with the fellow on the day of the patient's visit.  Brittni Jha MD, CAQ, CCD  [facility]  Sports Medicine and Bone Health    Mer Oliver DO

## 2019-11-20 NOTE — PROGRESS NOTES
Attending Note:   I have discussed this patient and have reviewed the clinical presentation and progress note with the fellow. I agree with the treatment plan as outlined. The plan was formulated with the fellow on the day of the patient's visit.  Brittni Jha MD, CAQ, CCD  [facility]  Sports Medicine and Bone Health

## 2019-12-02 ENCOUNTER — DOCUMENTATION ONLY (OUTPATIENT)
Dept: ORTHOPEDICS | Facility: CLINIC | Age: 24
End: 2019-12-02

## 2019-12-03 NOTE — PROGRESS NOTES
Received refill request for escitalopram.    Refill sent to [facility], but he needs a follow up with me prior to any more refills. Will notify ATC to have him follow up asap.    Mali Cazares MD

## 2019-12-18 ENCOUNTER — OFFICE VISIT (OUTPATIENT)
Dept: ORTHOPEDICS | Facility: CLINIC | Age: 24
End: 2019-12-18
Payer: COMMERCIAL

## 2019-12-18 VITALS
WEIGHT: 175 LBS | HEART RATE: 57 BPM | HEIGHT: 75 IN | SYSTOLIC BLOOD PRESSURE: 117 MMHG | BODY MASS INDEX: 21.76 KG/M2 | DIASTOLIC BLOOD PRESSURE: 79 MMHG

## 2019-12-18 DIAGNOSIS — F41.1 GENERALIZED ANXIETY DISORDER: Primary | ICD-10-CM

## 2019-12-18 RX ORDER — ESCITALOPRAM OXALATE 20 MG/1
20 TABLET ORAL DAILY
Qty: 90 TABLET | Refills: 0 | Status: SHIPPED | OUTPATIENT
Start: 2019-12-18

## 2019-12-18 ASSESSMENT — MIFFLIN-ST. JEOR: SCORE: 1874.42

## 2019-12-18 NOTE — LETTER
Date:December 23, 2019      Patient was self referred, no letter generated. Do not send.        [facility] Information

## 2019-12-18 NOTE — PROGRESS NOTES
"[facility] FOLLOW UP    Jackson R Wellenstein MRN# [id number]   Age: 24 year old YOB: 1995           Chief Complaint:     Follow up anxiety          History of Present Illness:     25 yo [employer] here to follow up anxiety. Has been taking Lexapro 20 mg now with good effect. No current med side effects except for decreased libido. No new sx or concerns. Wants to continue lexapro.          Medications:     Current Outpatient Medications   Medication Sig     escitalopram (LEXAPRO) 20 MG tablet Take 1 tablet (20 mg) by mouth daily     benzonatate (TESSALON) 200 MG capsule Take 1 capsule (200 mg) by mouth 3 times daily as needed for cough     escitalopram (LEXAPRO) 10 MG tablet Take 2 tablets (20 mg) by mouth daily     fluticasone (FLONASE) 50 MCG/ACT nasal spray Spray 1 spray into both nostrils daily     ondansetron (ZOFRAN ODT) 4 MG ODT tab Take 1 tablet (4 mg) by mouth every 8 hours as needed for nausea (Patient not taking: Reported on 9/18/2019)     No current facility-administered medications for this visit.              Allergies:    No Known Allergies         Review of Systems:   A comprehensive 10 point review of systems (constitutional, ENT, cardiac, peripheral vascular, respiratory, GI, , Musculoskeletal, skin, Neurological) was performed and found to be negative except as described in this note.           Physical Exam:   COMPLETE EXAMINATION:   VITAL SIGNS: /79   Pulse 57   Ht 1.905 m (6' 3\")   Wt 79.4 kg (175 lb)   BMI 21.87 kg/m    GEN: Alert, well-nourished, and in no distress.   HEENT:   Head: Normocephalic and atraumatic.   Eyes: PERRLA, EOMI  NEURO: Alert and oriented to person, place, and time. Gait normal.   SKIN: No rash, warmth or erythema  PSYCH: Mood, memory, affect and judgment normal.         Assessment:     Generalized Anxiety Disorder        Plan:     1. Continue Lexapro 20 mg daily. 3 month supply prescribed.  2. Encouraged reestablishing sports " psych.  3. Follow up in 3 months.    Mali Cazares M.D.    JAMI Barbosa was present for the entire visit.

## 2019-12-18 NOTE — LETTER
"  12/18/2019      RE: Jackson R Wellenstein  [address]  [facility] [address]       [facility] FOLLOW UP    Jackson R Wellenstein MRN# [id number]   Age: 24 year old YOB: 1995           Chief Complaint:     Follow up anxiety          History of Present Illness:     23 yo [employer] here to follow up anxiety. Has been taking Lexapro 20 mg now with good effect. No current med side effects except for decreased libido. No new sx or concerns. Wants to continue lexapro.          Medications:     Current Outpatient Medications   Medication Sig     escitalopram (LEXAPRO) 20 MG tablet Take 1 tablet (20 mg) by mouth daily     benzonatate (TESSALON) 200 MG capsule Take 1 capsule (200 mg) by mouth 3 times daily as needed for cough     escitalopram (LEXAPRO) 10 MG tablet Take 2 tablets (20 mg) by mouth daily     fluticasone (FLONASE) 50 MCG/ACT nasal spray Spray 1 spray into both nostrils daily     ondansetron (ZOFRAN ODT) 4 MG ODT tab Take 1 tablet (4 mg) by mouth every 8 hours as needed for nausea (Patient not taking: Reported on 9/18/2019)     No current facility-administered medications for this visit.              Allergies:    No Known Allergies         Review of Systems:   A comprehensive 10 point review of systems (constitutional, ENT, cardiac, peripheral vascular, respiratory, GI, , Musculoskeletal, skin, Neurological) was performed and found to be negative except as described in this note.           Physical Exam:   COMPLETE EXAMINATION:   VITAL SIGNS: /79   Pulse 57   Ht 1.905 m (6' 3\")   Wt 79.4 kg (175 lb)   BMI 21.87 kg/m     GEN: Alert, well-nourished, and in no distress.   HEENT:   Head: Normocephalic and atraumatic.   Eyes: PERRLA, EOMI  NEURO: Alert and oriented to person, place, and time. Gait normal.   SKIN: No rash, warmth or erythema  PSYCH: Mood, memory, affect and judgment normal.         Assessment:     Generalized Anxiety Disorder        Plan:     1. Continue " Lexapro 20 mg daily. 3 month supply prescribed.  2. Encouraged reestablishing sports psych.  3. Follow up in 3 months.    Mali Cazares M.D.    JAMI Barbosa was present for the entire visit.      Mali Cazares MD

## 2020-03-10 ENCOUNTER — HEALTH MAINTENANCE LETTER (OUTPATIENT)
Age: 25
End: 2020-03-10

## 2020-12-27 ENCOUNTER — HEALTH MAINTENANCE LETTER (OUTPATIENT)
Age: 25
End: 2020-12-27

## 2021-04-24 ENCOUNTER — HEALTH MAINTENANCE LETTER (OUTPATIENT)
Age: 26
End: 2021-04-24

## 2021-10-04 ENCOUNTER — HEALTH MAINTENANCE LETTER (OUTPATIENT)
Age: 26
End: 2021-10-04

## 2021-10-16 ENCOUNTER — ANCILLARY PROCEDURE (OUTPATIENT)
Dept: GENERAL RADIOLOGY | Facility: CLINIC | Age: 26
End: 2021-10-16
Attending: FAMILY MEDICINE
Payer: COMMERCIAL

## 2021-10-16 ENCOUNTER — OFFICE VISIT (OUTPATIENT)
Dept: URGENT CARE | Facility: URGENT CARE | Age: 26
End: 2021-10-16
Payer: COMMERCIAL

## 2021-10-16 VITALS
TEMPERATURE: 97.7 F | HEART RATE: 84 BPM | OXYGEN SATURATION: 100 % | BODY MASS INDEX: 20 KG/M2 | DIASTOLIC BLOOD PRESSURE: 78 MMHG | WEIGHT: 160 LBS | SYSTOLIC BLOOD PRESSURE: 126 MMHG

## 2021-10-16 DIAGNOSIS — R05.9 COUGH: ICD-10-CM

## 2021-10-16 DIAGNOSIS — R05.3 CHRONIC COUGH: Primary | ICD-10-CM

## 2021-10-16 PROCEDURE — U0003 INFECTIOUS AGENT DETECTION BY NUCLEIC ACID (DNA OR RNA); SEVERE ACUTE RESPIRATORY SYNDROME CORONAVIRUS 2 (SARS-COV-2) (CORONAVIRUS DISEASE [COVID-19]), AMPLIFIED PROBE TECHNIQUE, MAKING USE OF HIGH THROUGHPUT TECHNOLOGIES AS DESCRIBED BY CMS-2020-01-R: HCPCS | Performed by: FAMILY MEDICINE

## 2021-10-16 PROCEDURE — U0005 INFEC AGEN DETEC AMPLI PROBE: HCPCS | Performed by: FAMILY MEDICINE

## 2021-10-16 PROCEDURE — 99213 OFFICE O/P EST LOW 20 MIN: CPT | Performed by: FAMILY MEDICINE

## 2021-10-16 PROCEDURE — 71046 X-RAY EXAM CHEST 2 VIEWS: CPT | Performed by: RADIOLOGY

## 2021-10-16 RX ORDER — ALBUTEROL SULFATE 90 UG/1
2 AEROSOL, METERED RESPIRATORY (INHALATION) EVERY 6 HOURS
COMMUNITY

## 2021-10-16 NOTE — PROGRESS NOTES
Assessment & Plan     Chronic cough  Differentials discussed in detail including allergic etiology. Chest x-ray findings reviewed independently, unremarkable. COVID-19 test ordered. Recommended to continue well hydration, warm fluids, over-the-counter antitussive and allergist referral placed for further review and recommendations. Reminded to establish with PCP as well. Patient understood and in agreement with above plan. All questions answered.  - Adult Allergy/Asthma Referral; Future    Cough  - XR Chest 2 Views; Future  - Symptomatic COVID-19 Virus (Coronavirus) by PCR Nose         There are no Patient Instructions on file for this visit.      Onur Pedroza MD  [facility] KATHY Pritchett is a 25 year old who presents for the following health issues    HPI     Chief Complaint   Patient presents with     Urgent Care     Cough       Uncontrollable cough at night , some congestion, same symptoms 3 times in 3 months. Using albuterol inhaler without any significant relief.       Review of Systems   Constitutional, HEENT, cardiovascular, pulmonary, gi and gu systems are negative, except as otherwise noted.      Objective    /78   Pulse 84   Temp 97.7  F (36.5  C) (Tympanic)   Wt 72.6 kg (160 lb)   SpO2 100%   BMI 20.00 kg/m    Body mass index is 20 kg/m .  Physical Exam   GENERAL: healthy, alert and no distress  EYES: Eyes grossly normal to inspection, PERRL and conjunctivae and sclerae normal  HENT: ear canals and TM's normal, nose and mouth without ulcers or lesions  NECK: no adenopathy, no asymmetry, masses, or scars and thyroid normal to palpation  RESP: lungs clear to auscultation - no rales, rhonchi or wheezes  CV: regular rate and rhythm, normal S1 S2, no S3 or S4, no murmur, click or rub, no peripheral edema and peripheral pulses strong  ABDOMEN: soft, nontender, no hepatosplenomegaly, no masses and bowel sounds normal  MS: no gross musculoskeletal defects  noted, no edema

## 2021-10-17 LAB — SARS-COV-2 RNA RESP QL NAA+PROBE: NEGATIVE

## 2022-05-15 ENCOUNTER — HEALTH MAINTENANCE LETTER (OUTPATIENT)
Age: 27
End: 2022-05-15

## 2022-09-11 ENCOUNTER — HEALTH MAINTENANCE LETTER (OUTPATIENT)
Age: 27
End: 2022-09-11

## 2023-06-03 ENCOUNTER — HEALTH MAINTENANCE LETTER (OUTPATIENT)
Age: 28
End: 2023-06-03

## 2024-04-08 NOTE — PROGRESS NOTES
Attending Note:   I have personally examined this patient and have reviewed the clinical presentation and progress note with the fellow. I agree with the treatment plan as outlined. The plan was formulated with the fellow on the day of the patient's visit. I have reviewed all imaging with the fellow and agree with the findings in the documentation. I have assisted with and supervised the entire blood draw, PRP preparation and injection via MSK US guided performed by Dr. Rahman.     Brittni Jha MD, CAQ, CCD  [facility]  Sports Medicine and Bone Health   Pt placed on 4L NC. Improvement of o2 sat to 91%

## 2024-07-07 ENCOUNTER — HEALTH MAINTENANCE LETTER (OUTPATIENT)
Age: 29
End: 2024-07-07

## (undated) RX ORDER — LIDOCAINE HYDROCHLORIDE 10 MG/ML
INJECTION, SOLUTION INFILTRATION; PERINEURAL
Status: DISPENSED
Start: 2017-06-15